# Patient Record
Sex: MALE | Race: WHITE | Employment: OTHER | URBAN - METROPOLITAN AREA
[De-identification: names, ages, dates, MRNs, and addresses within clinical notes are randomized per-mention and may not be internally consistent; named-entity substitution may affect disease eponyms.]

---

## 2017-01-07 RX ORDER — LORAZEPAM 1 MG/1
TABLET ORAL
Qty: 30 TABLET | Refills: 0 | Status: SHIPPED | OUTPATIENT
Start: 2017-01-07 | End: 2017-02-05

## 2017-02-06 RX ORDER — LORAZEPAM 1 MG/1
TABLET ORAL
Qty: 30 TABLET | Refills: 1 | Status: SHIPPED | OUTPATIENT
Start: 2017-02-06 | End: 2017-04-17

## 2017-03-09 RX ORDER — SILDENAFIL CITRATE 100 MG
TABLET ORAL
Qty: 12 TABLET | Refills: 0 | Status: SHIPPED | OUTPATIENT
Start: 2017-03-09 | End: 2017-06-09

## 2017-04-04 RX ORDER — ESCITALOPRAM OXALATE 20 MG/1
TABLET ORAL
Qty: 30 TABLET | Refills: 0 | OUTPATIENT
Start: 2017-04-04

## 2017-04-06 RX ORDER — ESCITALOPRAM OXALATE 20 MG/1
20 TABLET ORAL
Qty: 30 TABLET | Refills: 0 | Status: SHIPPED | OUTPATIENT
Start: 2017-04-06 | End: 2017-05-09

## 2017-04-12 ENCOUNTER — APPOINTMENT (OUTPATIENT)
Dept: LAB | Age: 57
End: 2017-04-12
Attending: FAMILY MEDICINE
Payer: COMMERCIAL

## 2017-04-12 ENCOUNTER — OFFICE VISIT (OUTPATIENT)
Dept: FAMILY MEDICINE CLINIC | Facility: CLINIC | Age: 57
End: 2017-04-12

## 2017-04-12 VITALS
BODY MASS INDEX: 23.58 KG/M2 | WEIGHT: 157.38 LBS | DIASTOLIC BLOOD PRESSURE: 74 MMHG | TEMPERATURE: 98 F | SYSTOLIC BLOOD PRESSURE: 115 MMHG | RESPIRATION RATE: 17 BRPM | HEART RATE: 47 BPM | HEIGHT: 68.7 IN

## 2017-04-12 DIAGNOSIS — E78.5 HYPERLIPIDEMIA, UNSPECIFIED HYPERLIPIDEMIA TYPE: ICD-10-CM

## 2017-04-12 DIAGNOSIS — F41.1 GENERALIZED ANXIETY DISORDER: ICD-10-CM

## 2017-04-12 DIAGNOSIS — Z00.00 ROUTINE PHYSICAL EXAMINATION: Primary | ICD-10-CM

## 2017-04-12 DIAGNOSIS — Z00.00 ROUTINE PHYSICAL EXAMINATION: ICD-10-CM

## 2017-04-12 PROCEDURE — 80061 LIPID PANEL: CPT

## 2017-04-12 PROCEDURE — 80053 COMPREHEN METABOLIC PANEL: CPT

## 2017-04-12 PROCEDURE — 99396 PREV VISIT EST AGE 40-64: CPT | Performed by: FAMILY MEDICINE

## 2017-04-12 NOTE — PROGRESS NOTES
HPI:    Patient ID: Daniel Sexton is a 49year old male. HPI    Review of Systems   Constitutional: Negative. Respiratory: Negative. Cardiovascular: Negative. Gastrointestinal: Negative. Skin: Negative. Neurological: Negative. not taken simvastatin for the past 4 days. Otherwise compliant. Check labs today. Generalized anxiety disorder– continuing to see Dr. Kennedi Restrepo. Continuing on Lexapro 20 mg daily.   Encourage patient to once again consider decreasing to 10 mg pito

## 2017-04-17 RX ORDER — LORAZEPAM 1 MG/1
TABLET ORAL
Qty: 30 TABLET | Refills: 1 | OUTPATIENT
Start: 2017-04-17 | End: 2017-06-09

## 2017-04-19 RX ORDER — LORAZEPAM 1 MG/1
TABLET ORAL
Qty: 30 TABLET | Refills: 0 | OUTPATIENT
Start: 2017-04-19

## 2017-05-09 RX ORDER — ESCITALOPRAM OXALATE 20 MG/1
TABLET ORAL
Qty: 30 TABLET | Refills: 3 | Status: SHIPPED | OUTPATIENT
Start: 2017-05-09 | End: 2017-09-23

## 2017-05-09 RX ORDER — SIMVASTATIN 20 MG
TABLET ORAL
Qty: 90 TABLET | Refills: 3 | Status: SHIPPED | OUTPATIENT
Start: 2017-05-09 | End: 2018-05-21

## 2017-06-08 NOTE — TELEPHONE ENCOUNTER
Pt stts he is going on vacation and wanted to know if he can refill all meds this once Rx lexpro, Rx simvastatin, Rx lorazepam,  And Rx viagra.  Please call when ready         Current outpatient prescriptions:   •  ESCITALOPRAM 20 MG Oral Tab, TAKE 1 TABLET

## 2017-06-09 RX ORDER — LORAZEPAM 1 MG/1
TABLET ORAL
Qty: 30 TABLET | Refills: 1 | Status: SHIPPED | OUTPATIENT
Start: 2017-06-09 | End: 2017-08-27

## 2017-06-09 RX ORDER — SILDENAFIL 100 MG/1
TABLET, FILM COATED ORAL
Qty: 12 TABLET | Refills: 0 | Status: SHIPPED | OUTPATIENT
Start: 2017-06-09 | End: 2017-12-08

## 2017-06-09 NOTE — TELEPHONE ENCOUNTER
Viagra refilled per FM refill protocol  Please advise Lorazepam refill    Noted pt has refills for Escitalopram and Simvastatin as listed in EMR med lea.   LMTCB, transfer to [facility] ext [phone]    Refill Protocol Appointment Criteria  · Appointment sched

## 2017-06-09 NOTE — TELEPHONE ENCOUNTER
Pt returned call, reviewed remaining refills for Simvastatin and Escitalopram, he will contact his pharmacy for refills.  Also informed him Viagra was refilled and waiting for Dr. Naeem Liang to approve Lorazepam.

## 2017-08-29 RX ORDER — LORAZEPAM 1 MG/1
TABLET ORAL
Qty: 30 TABLET | Refills: 1 | OUTPATIENT
Start: 2017-08-29 | End: 2017-11-07

## 2017-08-29 NOTE — TELEPHONE ENCOUNTER
rx needs to be phoned in if approved.   Last refilled on 6-9-17 #30 #1Rf    Refill Protocol Appointment Criteria  · Appointment scheduled in the past 6 months or in the next 3 months  Recent Outpatient Visits            4 months ago Routine physical examina

## 2017-09-25 RX ORDER — ESCITALOPRAM OXALATE 20 MG/1
TABLET ORAL
Qty: 30 TABLET | Refills: 0 | Status: SHIPPED | OUTPATIENT
Start: 2017-09-25 | End: 2017-10-29

## 2017-10-31 RX ORDER — ESCITALOPRAM OXALATE 20 MG/1
TABLET ORAL
Qty: 30 TABLET | Refills: 0 | Status: SHIPPED | OUTPATIENT
Start: 2017-10-31 | End: 2017-12-04

## 2017-10-31 NOTE — TELEPHONE ENCOUNTER
Booked pt for appt with Dr Baron Chapa 11/28 at 4:45.  30 day supply of lexapro sent to pharmacy      Signed Prescriptions Disp Refills    ESCITALOPRAM 20 MG Oral Tab 30 tablet 0      Sig: TAKE 1 TABLET(20 MG) BY MOUTH EVERY DAY        Authorizing Provider: SHARON

## 2017-10-31 NOTE — TELEPHONE ENCOUNTER
Requesting Escitalopram refill    LMTCB, transfer to triage, noted on last refill pt was to schedule follow up appt in October, no appt scheduled.     Refill Protocol Appointment Criteria  · Appointment scheduled in the past 6 months or in the next 3 months

## 2017-11-09 RX ORDER — LORAZEPAM 1 MG/1
TABLET ORAL
Qty: 30 TABLET | Refills: 0 | Status: SHIPPED | OUTPATIENT
Start: 2017-11-09 | End: 2017-11-28

## 2017-11-28 NOTE — PROGRESS NOTES
HPI:    Patient ID: Shon Ag is a 56year old male. Hyperlipidemia   This is a chronic problem. The current episode started more than 1 year ago. The problem is controlled. He has no history of chronic renal disease or diabetes.  There are no k Imaging & Referrals:  FLULAVAL INFLUENZA VACCINE QUAD PRESERVATIVE FREE 0.5 ML       QF#[id number]

## 2017-12-04 RX ORDER — ESCITALOPRAM OXALATE 20 MG/1
TABLET ORAL
Qty: 30 TABLET | Refills: 3 | Status: SHIPPED | OUTPATIENT
Start: 2017-12-04 | End: 2018-05-16

## 2017-12-08 RX ORDER — SILDENAFIL CITRATE 100 MG
TABLET ORAL
Qty: 12 TABLET | Refills: 3 | Status: SHIPPED | OUTPATIENT
Start: 2017-12-08 | End: 2018-08-07 | Stop reason: ALTCHOICE

## 2018-01-23 RX ORDER — LORAZEPAM 1 MG/1
TABLET ORAL
Qty: 30 TABLET | Refills: 1 | OUTPATIENT
Start: 2018-01-23 | End: 2018-06-02

## 2018-05-16 RX ORDER — ESCITALOPRAM OXALATE 20 MG/1
TABLET ORAL
Qty: 30 TABLET | Refills: 0 | Status: SHIPPED | OUTPATIENT
Start: 2018-05-16 | End: 2018-06-25

## 2018-05-16 NOTE — TELEPHONE ENCOUNTER
30 day supply of medication sent to pharmacy per protocol. Due for 6 month OV follow-up.     Partial note from 11/28/17 OV as below;  ASSESSMENT/PLAN:   Generalized anxiety disorder  (primary encounter diagnosis)  Insomnia, unspecified type      Generalized

## 2018-05-18 ENCOUNTER — NURSE TRIAGE (OUTPATIENT)
Dept: OTHER | Age: 58
End: 2018-05-18

## 2018-05-18 NOTE — TELEPHONE ENCOUNTER
Action Requested: Summary for Provider     []  Critical Lab, Recommendations Needed  [] Need Additional Advice  []   FYI    []   Need Orders  [] Need Medications Sent to Pharmacy  []  Other     SUMMARY: Pt was advised to go to the closest ER, he is in [facility] [employer]

## 2018-05-21 RX ORDER — SIMVASTATIN 20 MG
TABLET ORAL
Qty: 90 TABLET | Refills: 0 | Status: SHIPPED | OUTPATIENT
Start: 2018-05-21 | End: 2018-05-30

## 2018-05-30 ENCOUNTER — OFFICE VISIT (OUTPATIENT)
Dept: FAMILY MEDICINE CLINIC | Facility: CLINIC | Age: 58
End: 2018-05-30

## 2018-05-30 VITALS
HEART RATE: 55 BPM | HEIGHT: 67.5 IN | WEIGHT: 151 LBS | SYSTOLIC BLOOD PRESSURE: 114 MMHG | BODY MASS INDEX: 23.42 KG/M2 | TEMPERATURE: 98 F | DIASTOLIC BLOOD PRESSURE: 70 MMHG

## 2018-05-30 DIAGNOSIS — F41.1 GENERALIZED ANXIETY DISORDER: ICD-10-CM

## 2018-05-30 DIAGNOSIS — E78.5 HYPERLIPIDEMIA, UNSPECIFIED HYPERLIPIDEMIA TYPE: ICD-10-CM

## 2018-05-30 DIAGNOSIS — I21.29 SEPTAL MYOCARDIAL INFARCTION (HCC): Primary | ICD-10-CM

## 2018-05-30 PROCEDURE — 99212 OFFICE O/P EST SF 10 MIN: CPT | Performed by: FAMILY MEDICINE

## 2018-05-30 PROCEDURE — 99214 OFFICE O/P EST MOD 30 MIN: CPT | Performed by: FAMILY MEDICINE

## 2018-05-30 RX ORDER — CLOPIDOGREL BISULFATE 75 MG/1
75 TABLET ORAL DAILY
COMMUNITY
End: 2018-06-28

## 2018-05-30 RX ORDER — LISINOPRIL 2.5 MG/1
2.5 TABLET ORAL DAILY
COMMUNITY
End: 2018-06-28

## 2018-05-30 RX ORDER — PANTOPRAZOLE SODIUM 40 MG/1
40 TABLET, DELAYED RELEASE ORAL
COMMUNITY
End: 2018-06-28

## 2018-05-30 RX ORDER — ATORVASTATIN CALCIUM 80 MG/1
80 TABLET, FILM COATED ORAL NIGHTLY
COMMUNITY
End: 2018-06-28

## 2018-05-30 RX ORDER — NITROGLYCERIN 0.4 MG/1
0.4 TABLET SUBLINGUAL EVERY 5 MIN PRN
COMMUNITY
End: 2018-08-07 | Stop reason: ALTCHOICE

## 2018-05-30 RX ORDER — DEXTROAMPHETAMINE SACCHARATE, AMPHETAMINE ASPARTATE MONOHYDRATE, DEXTROAMPHETAMINE SULFATE AND AMPHETAMINE SULFATE 1.25; 1.25; 1.25; 1.25 MG/1; MG/1; MG/1; MG/1
5 CAPSULE, EXTENDED RELEASE ORAL
COMMUNITY
End: 2018-08-07

## 2018-05-30 NOTE — PROGRESS NOTES
HPI:    Patient ID: Adolfo Bautista is a 56year old male. Heart Problem   This is a new problem. The current episode started 1 to 4 weeks ago. The problem occurs intermittently. The problem has been gradually improving.  Associated symptoms include c normal and breath sounds normal. He exhibits no tenderness. Lymphadenopathy:     He has no cervical adenopathy. Neurological: He is alert and oriented to person, place, and time. Skin: Skin is warm and dry.               ASSESSMENT/PLAN:   Septal myoc ordered in this encounter       Imaging & Referrals:  None       #[id number]

## 2018-06-02 NOTE — TELEPHONE ENCOUNTER
PLEASE ADVISE ON REFILL REQUEST      Recent Outpatient Visits            3 days ago Septal myocardial infarction [facility])    [facility], [facility], [address], Biju Langston MD    Office Visit    6 months ago Generalized anxiety disorder    Mandeep Stokes

## 2018-06-03 RX ORDER — LORAZEPAM 1 MG/1
TABLET ORAL
Qty: 30 TABLET | Refills: 5 | OUTPATIENT
Start: 2018-06-03 | End: 2018-10-17

## 2018-06-25 RX ORDER — ESCITALOPRAM OXALATE 20 MG/1
TABLET ORAL
Qty: 30 TABLET | Refills: 2 | Status: SHIPPED | OUTPATIENT
Start: 2018-06-25 | End: 2018-09-22

## 2018-06-25 NOTE — TELEPHONE ENCOUNTER
Rx approved for 90 days per protocol.     Refill Protocol Appointment Criteria  · Appointment scheduled in the past 6 months or in the next 3 months  Recent Outpatient Visits            3 weeks ago Septal myocardial infarction [facility])    [facility], [facility], [place]

## 2018-06-27 ENCOUNTER — TELEPHONE (OUTPATIENT)
Dept: FAMILY MEDICINE CLINIC | Facility: CLINIC | Age: 58
End: 2018-06-27

## 2018-06-28 RX ORDER — SILDENAFIL 100 MG/1
TABLET, FILM COATED ORAL
Qty: 12 TABLET | Refills: 3 | Status: CANCELLED | OUTPATIENT
Start: 2018-06-28

## 2018-06-28 RX ORDER — ESCITALOPRAM OXALATE 20 MG/1
20 TABLET ORAL
Qty: 90 TABLET | Refills: 0 | Status: CANCELLED | OUTPATIENT
Start: 2018-06-28

## 2018-06-29 RX ORDER — PANTOPRAZOLE SODIUM 40 MG/1
40 TABLET, DELAYED RELEASE ORAL
Qty: 90 TABLET | Refills: 0 | Status: SHIPPED | OUTPATIENT
Start: 2018-06-29 | End: 2018-10-17

## 2018-06-29 RX ORDER — LISINOPRIL 2.5 MG/1
2.5 TABLET ORAL DAILY
Qty: 90 TABLET | Refills: 0 | Status: SHIPPED | OUTPATIENT
Start: 2018-06-29 | End: 2018-10-17

## 2018-06-29 RX ORDER — CLOPIDOGREL BISULFATE 75 MG/1
75 TABLET ORAL DAILY
Qty: 90 TABLET | Refills: 0 | Status: SHIPPED | OUTPATIENT
Start: 2018-06-29 | End: 2020-10-20

## 2018-06-29 RX ORDER — ATORVASTATIN CALCIUM 80 MG/1
80 TABLET, FILM COATED ORAL NIGHTLY
Qty: 90 TABLET | Refills: 0 | Status: SHIPPED | OUTPATIENT
Start: 2018-06-29 | End: 2018-10-17

## 2018-06-29 NOTE — TELEPHONE ENCOUNTER
Please advise on pended medications.  Pt has OV appt 18 with Dr. Micah Mathews    Pt returning call (Name and  verified) and states that he already spoke with someone yesterday about the list of medications needed, but no notation was found except for preston the past 12 months  · Creatinine result < 2  Recent Outpatient Visits            1 month ago Septal myocardial infarction [facility])    [facility], [facility], [address], [address] [facility] MD [place]    Office Visit    7 months ago Generalized anxiety disorder    JANETTE

## 2018-08-07 ENCOUNTER — OFFICE VISIT (OUTPATIENT)
Dept: FAMILY MEDICINE CLINIC | Facility: CLINIC | Age: 58
End: 2018-08-07
Payer: COMMERCIAL

## 2018-08-07 ENCOUNTER — APPOINTMENT (OUTPATIENT)
Dept: LAB | Age: 58
End: 2018-08-07
Attending: FAMILY MEDICINE
Payer: COMMERCIAL

## 2018-08-07 VITALS
SYSTOLIC BLOOD PRESSURE: 116 MMHG | TEMPERATURE: 99 F | HEART RATE: 56 BPM | HEIGHT: 68 IN | WEIGHT: 154 LBS | BODY MASS INDEX: 23.34 KG/M2 | DIASTOLIC BLOOD PRESSURE: 72 MMHG | RESPIRATION RATE: 15 BRPM

## 2018-08-07 DIAGNOSIS — E78.5 HYPERLIPIDEMIA, UNSPECIFIED HYPERLIPIDEMIA TYPE: ICD-10-CM

## 2018-08-07 DIAGNOSIS — F41.1 GENERALIZED ANXIETY DISORDER: ICD-10-CM

## 2018-08-07 DIAGNOSIS — Z00.00 ROUTINE PHYSICAL EXAMINATION: Primary | ICD-10-CM

## 2018-08-07 DIAGNOSIS — Z00.00 ROUTINE PHYSICAL EXAMINATION: ICD-10-CM

## 2018-08-07 DIAGNOSIS — F98.8 ATTENTION DEFICIT DISORDER (ADD) WITHOUT HYPERACTIVITY: ICD-10-CM

## 2018-08-07 DIAGNOSIS — N52.9 ERECTILE DYSFUNCTION, UNSPECIFIED ERECTILE DYSFUNCTION TYPE: ICD-10-CM

## 2018-08-07 DIAGNOSIS — I25.2 HISTORY OF MI (MYOCARDIAL INFARCTION): ICD-10-CM

## 2018-08-07 LAB
ALBUMIN SERPL BCP-MCNC: 4.5 G/DL (ref 3.5–4.8)
ALBUMIN/GLOB SERPL: 1.8 {RATIO} (ref 1–2)
ALP SERPL-CCNC: 81 U/L (ref 32–100)
ALT SERPL-CCNC: 35 U/L (ref 17–63)
ANION GAP SERPL CALC-SCNC: 11 MMOL/L (ref 0–18)
AST SERPL-CCNC: 37 U/L (ref 15–41)
BILIRUB SERPL-MCNC: 0.7 MG/DL (ref 0.3–1.2)
BUN SERPL-MCNC: 13 MG/DL (ref 8–20)
BUN/CREAT SERPL: 11.9 (ref 10–20)
CALCIUM SERPL-MCNC: 9.3 MG/DL (ref 8.5–10.5)
CHLORIDE SERPL-SCNC: 97 MMOL/L (ref 95–110)
CHOLEST SERPL-MCNC: 155 MG/DL (ref 110–200)
CO2 SERPL-SCNC: 28 MMOL/L (ref 22–32)
CREAT SERPL-MCNC: 1.09 MG/DL (ref 0.5–1.5)
GLOBULIN PLAS-MCNC: 2.5 G/DL (ref 2.5–3.7)
GLUCOSE SERPL-MCNC: 85 MG/DL (ref 70–99)
HDLC SERPL-MCNC: 43 MG/DL
LDLC SERPL CALC-MCNC: 85 MG/DL (ref 0–99)
NONHDLC SERPL-MCNC: 112 MG/DL
OSMOLALITY UR CALC.SUM OF ELEC: 281 MOSM/KG (ref 275–295)
PATIENT FASTING: NO
POTASSIUM SERPL-SCNC: 3.7 MMOL/L (ref 3.3–5.1)
PROT SERPL-MCNC: 7 G/DL (ref 5.9–8.4)
PSA SERPL-MCNC: 2.4 NG/ML (ref 0–4)
SODIUM SERPL-SCNC: 136 MMOL/L (ref 136–144)
TRIGL SERPL-MCNC: 137 MG/DL (ref 1–149)

## 2018-08-07 PROCEDURE — 99396 PREV VISIT EST AGE 40-64: CPT | Performed by: FAMILY MEDICINE

## 2018-08-07 PROCEDURE — 80061 LIPID PANEL: CPT

## 2018-08-07 PROCEDURE — 36415 COLL VENOUS BLD VENIPUNCTURE: CPT

## 2018-08-07 PROCEDURE — 80053 COMPREHEN METABOLIC PANEL: CPT

## 2018-08-07 RX ORDER — DEXTROAMPHETAMINE SACCHARATE, AMPHETAMINE ASPARTATE, DEXTROAMPHETAMINE SULFATE AND AMPHETAMINE SULFATE 1.25; 1.25; 1.25; 1.25 MG/1; MG/1; MG/1; MG/1
5 TABLET ORAL DAILY
Qty: 30 TABLET | Refills: 0 | Status: SHIPPED | OUTPATIENT
Start: 2018-08-07 | End: 2019-04-30

## 2018-08-07 RX ORDER — SILDENAFIL 100 MG/1
100 TABLET, FILM COATED ORAL
Qty: 12 TABLET | Refills: 5 | Status: SHIPPED | OUTPATIENT
Start: 2018-08-07 | End: 2018-10-25

## 2018-08-07 NOTE — PROGRESS NOTES
HPI:    Patient ID: Rolan Jane is a 56year old male. HPI    Review of Systems   Constitutional: Negative. Respiratory: Negative. Cardiovascular: Negative. Gastrointestinal: Negative. Skin: Negative. Neurological: Negative. examination  (primary encounter diagnosis)  Hyperlipidemia, unspecified hyperlipidemia type  History of mi (myocardial infarction)  Generalized anxiety disorder  Attention deficit disorder (add) without hyperactivity  Erectile dysfunction, unspecified erec tablet (5 mg total) by mouth daily.            Imaging & Referrals:  None       #[id number]

## 2018-09-21 ENCOUNTER — TELEPHONE (OUTPATIENT)
Dept: FAMILY MEDICINE CLINIC | Facility: CLINIC | Age: 58
End: 2018-09-21

## 2018-09-21 NOTE — TELEPHONE ENCOUNTER
Pt states Dr. Nicolasa Linares wanted him to get a medical clearance from cardiologist to take meds below due to having a heart attack and the Pt states he is having a hard time reaching his cardiologist.    [address]  amphetamine-dextroamphetamine (ADDERALL) 5 MG Oral Tab, Take 1 tablet (5 mg total) by mouth daily. , Disp: 30 tablet, Rfl: 0

## 2018-09-23 RX ORDER — ESCITALOPRAM OXALATE 20 MG/1
TABLET ORAL
Qty: 90 TABLET | Refills: 1 | Status: SHIPPED | OUTPATIENT
Start: 2018-09-23 | End: 2019-04-08

## 2018-10-04 NOTE — TELEPHONE ENCOUNTER
Thanks, I do see that the he made on care everywhere. Please contact their office. Also, print and send letter written today.

## 2018-10-04 NOTE — TELEPHONE ENCOUNTER
[facility]---patient states that he has called Cay Schlatter from [address] [place] [address] Cardiology several times but she hasn't returned his calls. I can see documentation in Care Everywhere. Do you want our office to try and contact her?

## 2018-10-08 NOTE — TELEPHONE ENCOUNTER
I spoke with Dr. Randa Betancur office. They stated that [facility] primary care Dr should do the clearance. The letter was also mailed.

## 2018-10-11 NOTE — TELEPHONE ENCOUNTER
Left message for patient that I will give Dr. her in a chance to respond to my letter. If no response within a month then he will contact me and we can review safety of stimulant medication use in patients with history of ischemic heart disease, without arrhythmia.

## 2018-10-17 RX ORDER — ATORVASTATIN CALCIUM 80 MG/1
80 TABLET, FILM COATED ORAL NIGHTLY
Qty: 90 TABLET | Refills: 0 | Status: SHIPPED | OUTPATIENT
Start: 2018-10-17 | End: 2019-01-01

## 2018-10-17 RX ORDER — ESCITALOPRAM OXALATE 20 MG/1
20 TABLET ORAL
Qty: 90 TABLET | Refills: 1 | OUTPATIENT
Start: 2018-10-17

## 2018-10-17 RX ORDER — PANTOPRAZOLE SODIUM 40 MG/1
40 TABLET, DELAYED RELEASE ORAL
Qty: 90 TABLET | Refills: 0 | Status: SHIPPED | OUTPATIENT
Start: 2018-10-17 | End: 2019-01-01

## 2018-10-17 RX ORDER — LISINOPRIL 2.5 MG/1
2.5 TABLET ORAL DAILY
Qty: 90 TABLET | Refills: 0 | Status: SHIPPED | OUTPATIENT
Start: 2018-10-17 | End: 2019-01-01

## 2018-10-17 RX ORDER — CLOPIDOGREL BISULFATE 75 MG/1
75 TABLET ORAL DAILY
Qty: 90 TABLET | Refills: 0 | Status: CANCELLED | OUTPATIENT
Start: 2018-10-17

## 2018-10-17 NOTE — TELEPHONE ENCOUNTER
Current Outpatient Medications:     Current Outpatient Medications:   •  ESCITALOPRAM 20 MG Oral Tab, TAKE 1 TABLET BY MOUTH EVERY DAY, Disp: 90 tablet, Rfl: 1  •  Pantoprazole Sodium 40 MG Oral Tab EC, Take 1 tablet (40 mg total) by mouth every morning

## 2018-10-17 NOTE — TELEPHONE ENCOUNTER
Regarding lorazepam refill–does patient want this sent to mail order? If so, should be 90-day.   Regarding clopidogrel refill– this is usually prescribed by cardiology as they best know what type of stent was placed, and how long clopidogrel should be used

## 2018-10-17 NOTE — TELEPHONE ENCOUNTER
Rx approved for 90 days per protocol for pantoprazole, lisinopril, and atorvastatin. Refill too soon for Escitalopram. Patient received refill on 09/23/18 for 6 month supply. Please advise on refill request for Lorazepam and Clopidogrel.      Hypertensi Jordan May MD    Office Visit    10 months ago Generalized anxiety disorder    Max Galaviz MD    Office Visit    1 year ago Routine physical examination    [facility], [facility], [address] [place] [address]

## 2018-10-18 RX ORDER — DEXTROAMPHETAMINE SACCHARATE, AMPHETAMINE ASPARTATE, DEXTROAMPHETAMINE SULFATE AND AMPHETAMINE SULFATE 1.25; 1.25; 1.25; 1.25 MG/1; MG/1; MG/1; MG/1
5 TABLET ORAL DAILY
Qty: 30 TABLET | Refills: 0 | Status: CANCELLED | OUTPATIENT
Start: 2018-10-18

## 2018-10-18 RX ORDER — LORAZEPAM 1 MG/1
TABLET ORAL
Qty: 30 TABLET | Refills: 5 | Status: SHIPPED
Start: 2018-10-18 | End: 2019-05-13

## 2018-10-18 NOTE — TELEPHONE ENCOUNTER
Advised patient of Dr. Charley esquivel. Patient verbalized understanding  Patient states he would like Lorazepam sent to [facility]. Patient also requesting refill for Adderall.      Dr. Rodolfo Amaral:   Please see pended scripts

## 2018-10-18 NOTE — TELEPHONE ENCOUNTER
Please let him know refill sent to [place] for lorazepam.  Regarding Adderall– I am still waiting for cardiology clearance. I am aware cardiologist told patient that she should get this from PCP.   However, I am not comfortable with this and wrote letter

## 2018-10-19 NOTE — TELEPHONE ENCOUNTER
Pt returned call, verified name and . Reviewed doctor's message as noted below. Pt had no further questions at this time.

## 2018-10-24 RX ORDER — ESCITALOPRAM OXALATE 20 MG/1
20 TABLET ORAL
Qty: 90 TABLET | Refills: 1 | OUTPATIENT
Start: 2018-10-24

## 2018-10-24 RX ORDER — LORAZEPAM 1 MG/1
TABLET ORAL
Qty: 30 TABLET | Refills: 5 | OUTPATIENT
Start: 2018-10-24

## 2018-10-24 NOTE — TELEPHONE ENCOUNTER
Refill too soon for Lorazepam and Lexapro. Please advise on refill request for Clopidogrel and Sildenafil.      Refill Protocol Appointment Criteria  · Appointment scheduled in the past 6 months or in the next 3 months  Recent Outpatient Visits

## 2018-10-24 NOTE — TELEPHONE ENCOUNTER
[facility] is calling would like refills to be sent to them      Current Outpatient Medications:  LORazepam 1 MG Oral Tab TAKE 1 TABLET BY MOUTH NIGHTLY AS NEEDED Disp: 30 tablet Rfl: 5                     ESCITALOPRAM 20 MG Oral Tab TAKE 1 TABLET BY MOUTH E

## 2018-10-25 RX ORDER — SILDENAFIL 100 MG/1
100 TABLET, FILM COATED ORAL
Qty: 12 TABLET | Refills: 5 | Status: SHIPPED | OUTPATIENT
Start: 2018-10-25 | End: 2019-04-22

## 2018-10-25 RX ORDER — CLOPIDOGREL BISULFATE 75 MG/1
75 TABLET ORAL DAILY
Qty: 90 TABLET | Refills: 0 | OUTPATIENT
Start: 2018-10-25

## 2018-10-25 NOTE — TELEPHONE ENCOUNTER
Contacted pharmacy and spoke to [employer] (pharmacist) and informed her that Clopidogrel needs to be refilled by Fred Huerta PA-C at [facility] due to patient's recent cardiac stent placement, per Dr. Zulma Choi.

## 2018-12-03 ENCOUNTER — NURSE TRIAGE (OUTPATIENT)
Dept: OTHER | Age: 58
End: 2018-12-03

## 2018-12-03 ENCOUNTER — OFFICE VISIT (OUTPATIENT)
Dept: FAMILY MEDICINE CLINIC | Facility: CLINIC | Age: 58
End: 2018-12-03
Payer: COMMERCIAL

## 2018-12-03 VITALS
TEMPERATURE: 98 F | HEART RATE: 71 BPM | DIASTOLIC BLOOD PRESSURE: 66 MMHG | HEIGHT: 67.5 IN | BODY MASS INDEX: 23.89 KG/M2 | RESPIRATION RATE: 18 BRPM | WEIGHT: 154 LBS | SYSTOLIC BLOOD PRESSURE: 112 MMHG | OXYGEN SATURATION: 95 %

## 2018-12-03 DIAGNOSIS — J01.90 ACUTE SINUSITIS, RECURRENCE NOT SPECIFIED, UNSPECIFIED LOCATION: Primary | ICD-10-CM

## 2018-12-03 PROCEDURE — 99213 OFFICE O/P EST LOW 20 MIN: CPT | Performed by: FAMILY MEDICINE

## 2018-12-03 PROCEDURE — 99212 OFFICE O/P EST SF 10 MIN: CPT | Performed by: FAMILY MEDICINE

## 2018-12-03 RX ORDER — AMOXICILLIN AND CLAVULANATE POTASSIUM 875; 125 MG/1; MG/1
1 TABLET, FILM COATED ORAL 2 TIMES DAILY
Qty: 20 TABLET | Refills: 0 | Status: SHIPPED | OUTPATIENT
Start: 2018-12-03 | End: 2018-12-13

## 2018-12-03 RX ORDER — CODEINE PHOSPHATE AND GUAIFENESIN 10; 100 MG/5ML; MG/5ML
5 SOLUTION ORAL EVERY 6 HOURS PRN
Qty: 180 ML | Refills: 0 | Status: SHIPPED | OUTPATIENT
Start: 2018-12-03 | End: 2019-04-30 | Stop reason: ALTCHOICE

## 2018-12-03 NOTE — PROGRESS NOTES
HPI:    Patient ID: Sandee Mo is a 66year old male. Cough   The current episode started 1 to 4 weeks ago. The problem has been waxing and waning. The cough is productive of sputum.  Associated symptoms include headaches, nasal congestion, postn Known Allergies   PHYSICAL EXAM:   Physical Exam   Constitutional: He appears well-developed and well-nourished. HENT:   Right Ear: External ear normal.   Left Ear: External ear normal.   Nose: Right sinus exhibits frontal sinus tenderness.  Left sinus ex

## 2018-12-03 NOTE — TELEPHONE ENCOUNTER
Action Requested: Summary for Provider     []  Critical Lab, Recommendations Needed  [] Need Additional Advice  []   FYI    []   Need Orders  [] Need Medications Sent to Pharmacy  []  Other     SUMMARY: appt scheduled today to see PCP  Onset > 3 weeks ag

## 2018-12-07 ENCOUNTER — TELEPHONE (OUTPATIENT)
Dept: FAMILY MEDICINE CLINIC | Facility: CLINIC | Age: 58
End: 2018-12-07

## 2018-12-07 RX ORDER — LORAZEPAM 1 MG/1
TABLET ORAL
Qty: 90 TABLET | Refills: 0
Start: 2018-12-07

## 2018-12-07 NOTE — TELEPHONE ENCOUNTER
Spoke with patient regarding refill request.   Per patient, he just received a 30 day supply of Lorazepam.   Patient states [facility] is encouraging him to get 90 day supply on all his medications.      Patient states he is okay right now with the Lorazepam.

## 2018-12-07 NOTE — TELEPHONE ENCOUNTER
Anibal Finney from [facility] pharmacy was calling to inquire on the [facility] listed below. Pharmacy was looking for the approval that this medication can be refilled. Please advise thank you.               LORazepam 1 MG Oral Tab TAKE 1 TABLET BY MOUTH NIGHTLY

## 2018-12-07 NOTE — TELEPHONE ENCOUNTER
Please check with patient to clarify his last prescription was as below. Should have refills. Are they looking for a 90-day?

## 2019-01-02 ENCOUNTER — TELEPHONE (OUTPATIENT)
Dept: OTHER | Age: 59
End: 2019-01-02

## 2019-01-02 RX ORDER — ATORVASTATIN CALCIUM 80 MG/1
TABLET, FILM COATED ORAL
Qty: 90 TABLET | Refills: 0 | Status: SHIPPED | OUTPATIENT
Start: 2019-01-02 | End: 2019-04-22

## 2019-01-02 RX ORDER — LISINOPRIL 2.5 MG/1
TABLET ORAL
Qty: 90 TABLET | Refills: 0 | Status: SHIPPED | OUTPATIENT
Start: 2019-01-02 | End: 2019-04-22

## 2019-01-02 RX ORDER — CLINDAMYCIN HYDROCHLORIDE 300 MG/1
300 CAPSULE ORAL 3 TIMES DAILY
Qty: 21 CAPSULE | Refills: 0 | Status: SHIPPED | OUTPATIENT
Start: 2019-01-02 | End: 2019-01-09

## 2019-01-02 RX ORDER — PANTOPRAZOLE SODIUM 40 MG/1
TABLET, DELAYED RELEASE ORAL
Qty: 90 TABLET | Refills: 0 | Status: SHIPPED | OUTPATIENT
Start: 2019-01-02 | End: 2019-04-22

## 2019-01-02 NOTE — TELEPHONE ENCOUNTER
Suspect likely recurrence sinusitis. Recommend clindamycin as directed, nasal saline lavage twice daily. Call if not improved in 1 week, sooner if worsening.

## 2019-01-02 NOTE — TELEPHONE ENCOUNTER
LOV 12/3/18, completed the antibiotic on 12/23/18 feels better and then symptoms came back on 12/27/18 and getting worse, still with chest congestion and continous cough with dark greenish phlegm, sore throat, chills, no fever, body aches, no cp, no sob,he

## 2019-04-08 RX ORDER — ESCITALOPRAM OXALATE 20 MG/1
TABLET ORAL
Qty: 90 TABLET | Refills: 0 | Status: SHIPPED | OUTPATIENT
Start: 2019-04-08 | End: 2019-07-07

## 2019-04-22 ENCOUNTER — TELEPHONE (OUTPATIENT)
Dept: FAMILY MEDICINE CLINIC | Facility: CLINIC | Age: 59
End: 2019-04-22

## 2019-04-22 NOTE — TELEPHONE ENCOUNTER
Pt is calling state he has not been getting some of his medications refilled  would like a nurse to call him  to go over what he needs to be taking. Pt would like a call today.

## 2019-04-22 NOTE — TELEPHONE ENCOUNTER
Pt states that these are all current rxs but he ran out of them in the beginning of April. Please advise.

## 2019-04-24 RX ORDER — LISINOPRIL 2.5 MG/1
2.5 TABLET ORAL
Qty: 90 TABLET | Refills: 1 | Status: SHIPPED | OUTPATIENT
Start: 2019-04-24 | End: 2019-10-29

## 2019-04-24 RX ORDER — SILDENAFIL 100 MG/1
100 TABLET, FILM COATED ORAL
Qty: 12 TABLET | Refills: 5 | Status: SHIPPED | OUTPATIENT
Start: 2019-04-24 | End: 2020-10-20

## 2019-04-24 RX ORDER — PANTOPRAZOLE SODIUM 40 MG/1
TABLET, DELAYED RELEASE ORAL
Qty: 90 TABLET | Refills: 1 | Status: SHIPPED | OUTPATIENT
Start: 2019-04-24 | End: 2019-10-29

## 2019-04-24 RX ORDER — ATORVASTATIN CALCIUM 80 MG/1
TABLET, FILM COATED ORAL
Qty: 90 TABLET | Refills: 1 | Status: SHIPPED | OUTPATIENT
Start: 2019-04-24 | End: 2019-10-29

## 2019-04-30 NOTE — PROGRESS NOTES
HPI:    Patient ID: Nico Alcantara is a 66year old male. Anxiety   This is a chronic problem. The current episode started more than 1 year ago. The problem has been unchanged. Pertinent negatives include no chest pain, fatigue or headaches.  The sym Psychiatric: He has a normal mood and affect.               ASSESSMENT/PLAN:   Generalized anxiety disorder  (primary encounter diagnosis)  Attention deficit disorder (add) without hyperactivity  Septal myocardial infarction (hcc)     Generalized anxiety

## 2019-05-13 RX ORDER — LORAZEPAM 1 MG/1
TABLET ORAL
Qty: 30 TABLET | Refills: 5 | Status: SHIPPED
Start: 2019-05-13 | End: 2019-11-13

## 2019-05-13 NOTE — TELEPHONE ENCOUNTER
Review pended refill request as it does not fall under a protocol.     Last Rx: 10/18/18 #30 x5    Refill Protocol Appointment Criteria  · Appointment scheduled in the past 6 months or in the next 3 months  Recent Outpatient Visits            1 week ago Gen

## 2019-06-13 NOTE — TELEPHONE ENCOUNTER
Pt states he would like Bupropion removed from his chart as states this medication does not help. This RN will remove. Patient also requesting Ativan be sent to [facility] at [address].  Called this pharmacy and [facility] states he will have it transferr

## 2019-06-14 NOTE — TELEPHONE ENCOUNTER
Spoke with patient ( verified)--states, \"I did not talk to Vicki about this yesterday, but I was recently terminated from my job--that has caused increased anxiety.  I have needed to take my Lorazepam during the day, which leaves me with no Lorazepam to

## 2019-06-14 NOTE — TELEPHONE ENCOUNTER
Looks like he was started on Wellbutrin and stopped taking this as well. I think he needs to be seen for further dose adjustments/increases.  Thanks

## 2019-07-01 NOTE — TELEPHONE ENCOUNTER
Agree with advice given. I do not see that patient has made appointment.   Can you please let him know that I am not in favor of increasing dose of lorazepam.  May be indicated, however when this is the case treatment with a psychiatrist is more appropriat

## 2019-07-08 RX ORDER — ESCITALOPRAM OXALATE 20 MG/1
TABLET ORAL
Qty: 90 TABLET | Refills: 1 | Status: SHIPPED | OUTPATIENT
Start: 2019-07-08 | End: 2020-03-16

## 2019-07-08 NOTE — TELEPHONE ENCOUNTER
LMTCB transfer to triage--last script 6/29/18 for #90, not on med list, please verify with patient about this med.     Fuze message sent

## 2019-07-08 NOTE — TELEPHONE ENCOUNTER
Refill passed per [facility] protocol.   Refill Protocol Appointment Criteria  · Appointment scheduled in the past 6 months or in the next 3 months  Recent Outpatient Visits            2 months ago Generalized anxiety disorder    Neha Morel

## 2019-07-10 RX ORDER — CLOPIDOGREL BISULFATE 75 MG/1
TABLET ORAL
Qty: 90 TABLET | Refills: 0 | OUTPATIENT
Start: 2019-07-10

## 2019-07-10 NOTE — TELEPHONE ENCOUNTER
Please remind patient as we have discussed, he needs to see cardiologist!  He did not like his [facility] cardiologist, so I referred him to Dr. Kinza Allred, please review contact information with patient. He would like him very much.   It is most likely that t

## 2019-07-10 NOTE — TELEPHONE ENCOUNTER
Theron message still not read by pt. I called pt. Pt stts that he has not filled that Plavix since 2018. Is questioning whether he should be on the medication. Last OV 4/2019. Please advise. Thanks.     Pt stjames that he is also getting messages from W

## 2019-07-10 NOTE — TELEPHONE ENCOUNTER
The patient was called and informed with understanding. He already scheduled for 7/26/19 at 10:45 am with cardiology.

## 2019-07-26 ENCOUNTER — TELEPHONE (OUTPATIENT)
Dept: FAMILY MEDICINE CLINIC | Facility: CLINIC | Age: 59
End: 2019-07-26

## 2019-07-26 NOTE — TELEPHONE ENCOUNTER
Pt call in stating that he saw Dr. Aman Melo today regarding his appt for his heart and to potentially be put on Adderall. Pt states that during his OV Dr. Aman Melo stated that he will reach out to Dr. Karon Langston regarding this.  Pt wanted to make sure Dr. Karon Langston wa

## 2019-07-27 RX ORDER — DEXTROAMPHETAMINE SACCHARATE, AMPHETAMINE ASPARTATE, DEXTROAMPHETAMINE SULFATE AND AMPHETAMINE SULFATE 1.25; 1.25; 1.25; 1.25 MG/1; MG/1; MG/1; MG/1
5 TABLET ORAL DAILY
Qty: 30 TABLET | Refills: 0 | Status: SHIPPED | OUTPATIENT
Start: 2019-08-26 | End: 2019-09-25

## 2019-07-27 RX ORDER — DEXTROAMPHETAMINE SACCHARATE, AMPHETAMINE ASPARTATE, DEXTROAMPHETAMINE SULFATE AND AMPHETAMINE SULFATE 1.25; 1.25; 1.25; 1.25 MG/1; MG/1; MG/1; MG/1
5 TABLET ORAL DAILY
Qty: 30 TABLET | Refills: 0 | Status: SHIPPED | OUTPATIENT
Start: 2019-09-25 | End: 2019-10-25

## 2019-07-27 RX ORDER — DEXTROAMPHETAMINE SACCHARATE, AMPHETAMINE ASPARTATE, DEXTROAMPHETAMINE SULFATE AND AMPHETAMINE SULFATE 1.25; 1.25; 1.25; 1.25 MG/1; MG/1; MG/1; MG/1
5 TABLET ORAL DAILY
Qty: 30 TABLET | Refills: 0 | Status: SHIPPED | OUTPATIENT
Start: 2019-07-27 | End: 2019-08-26

## 2019-07-27 NOTE — TELEPHONE ENCOUNTER
Received message from Dr. Gely Amor, cardiology clearance for use of Adderall. Patient informed, will  prescriptions and follow-up with me in 3 months.

## 2019-10-31 RX ORDER — PANTOPRAZOLE SODIUM 40 MG/1
TABLET, DELAYED RELEASE ORAL
Qty: 90 TABLET | Refills: 1 | Status: SHIPPED | OUTPATIENT
Start: 2019-10-31 | End: 2021-03-15

## 2019-11-01 RX ORDER — ATORVASTATIN CALCIUM 80 MG/1
TABLET, FILM COATED ORAL
Qty: 90 TABLET | Refills: 0 | OUTPATIENT
Start: 2019-11-01

## 2019-11-01 RX ORDER — ATORVASTATIN CALCIUM 80 MG/1
TABLET, FILM COATED ORAL
Qty: 90 TABLET | Refills: 0 | Status: SHIPPED | OUTPATIENT
Start: 2019-11-01 | End: 2020-02-03

## 2019-11-01 RX ORDER — PANTOPRAZOLE SODIUM 40 MG/1
TABLET, DELAYED RELEASE ORAL
Qty: 90 TABLET | Refills: 0 | OUTPATIENT
Start: 2019-11-01

## 2019-11-01 RX ORDER — LISINOPRIL 2.5 MG/1
TABLET ORAL
Qty: 90 TABLET | Refills: 0 | OUTPATIENT
Start: 2019-11-01

## 2019-11-01 RX ORDER — LISINOPRIL 2.5 MG/1
TABLET ORAL
Qty: 90 TABLET | Refills: 0 | Status: SHIPPED | OUTPATIENT
Start: 2019-11-01 | End: 2020-02-26

## 2019-11-01 RX ORDER — DEXTROAMPHETAMINE SACCHARATE, AMPHETAMINE ASPARTATE, DEXTROAMPHETAMINE SULFATE AND AMPHETAMINE SULFATE 1.25; 1.25; 1.25; 1.25 MG/1; MG/1; MG/1; MG/1
5 TABLET ORAL DAILY
Qty: 30 TABLET | Refills: 0 | Status: CANCELLED | OUTPATIENT
Start: 2019-11-01 | End: 2019-12-01

## 2019-11-01 NOTE — TELEPHONE ENCOUNTER
Appt request sent via [facility] please f/u, thanks    Please review; protocol failed.  D/t labs and appt  Requested Prescriptions     Pending Prescriptions Disp Refills   • PANTOPRAZOLE SODIUM 40 MG Oral Tab EC [Pharmacy Med Name: PANTOPRAZOLE 40MG TABLE infarction [facility])    [facility], [facility], [address], [facility] Rafaela Drake MD    Office Visit

## 2019-11-01 NOTE — TELEPHONE ENCOUNTER
Please contact patient to make six-month follow-up with me. At that appointment we will do fasting labs as he is overdue.

## 2019-11-01 NOTE — TELEPHONE ENCOUNTER
Patient called in requested refill for Adderall    Patient stated he called the pharmacy and he was told to call in

## 2019-11-02 NOTE — TELEPHONE ENCOUNTER
Controlled medication pending for review. Please change to phone in, fax, or print script if not being sent electronically.     Last Rx: 7-27-19 # 30 + 2  LOV: 4-30-19    Requested Prescriptions     Pending Prescriptions Disp Refills   • amphetamine-dextro

## 2019-11-04 RX ORDER — DEXTROAMPHETAMINE SACCHARATE, AMPHETAMINE ASPARTATE, DEXTROAMPHETAMINE SULFATE AND AMPHETAMINE SULFATE 1.25; 1.25; 1.25; 1.25 MG/1; MG/1; MG/1; MG/1
5 TABLET ORAL DAILY
Qty: 30 TABLET | Refills: 0 | OUTPATIENT
Start: 2019-12-02 | End: 2020-01-01

## 2019-11-04 RX ORDER — DEXTROAMPHETAMINE SACCHARATE, AMPHETAMINE ASPARTATE, DEXTROAMPHETAMINE SULFATE AND AMPHETAMINE SULFATE 1.25; 1.25; 1.25; 1.25 MG/1; MG/1; MG/1; MG/1
5 TABLET ORAL DAILY
Qty: 30 TABLET | Refills: 0 | OUTPATIENT
Start: 2020-01-02

## 2019-11-04 RX ORDER — DEXTROAMPHETAMINE SACCHARATE, AMPHETAMINE ASPARTATE, DEXTROAMPHETAMINE SULFATE AND AMPHETAMINE SULFATE 1.25; 1.25; 1.25; 1.25 MG/1; MG/1; MG/1; MG/1
5 TABLET ORAL DAILY
Qty: 30 TABLET | Refills: 0 | OUTPATIENT
Start: 2019-11-04

## 2019-11-06 ENCOUNTER — TELEPHONE (OUTPATIENT)
Dept: OTHER | Age: 59
End: 2019-11-06

## 2019-11-06 NOTE — TELEPHONE ENCOUNTER
Patient calling and requesting to cancel today's schedule due to personal reason about his house. States that he will call us back to re schedule it. OV cancelled.

## 2019-11-12 RX ORDER — DEXTROAMPHETAMINE SACCHARATE, AMPHETAMINE ASPARTATE, DEXTROAMPHETAMINE SULFATE AND AMPHETAMINE SULFATE 1.25; 1.25; 1.25; 1.25 MG/1; MG/1; MG/1; MG/1
5 TABLET ORAL DAILY
Qty: 30 TABLET | Refills: 0 | OUTPATIENT
Start: 2019-11-12 | End: 2019-12-12

## 2019-11-12 NOTE — TELEPHONE ENCOUNTER
Dr Karon Langston, patient called 11/6/19 and cancelled his appt with you for 'personal reasons' see 11/6/19 appt request encounter. The adderall rx are pending, will you refill or do you need to see the patient?

## 2019-11-13 PROBLEM — F98.8 ATTENTION DEFICIT DISORDER (ADD) WITHOUT HYPERACTIVITY: Status: ACTIVE | Noted: 2019-11-13

## 2019-11-13 NOTE — TELEPHONE ENCOUNTER
Jennifer Garcia needs a refill of:                                         • LORazepam 1 MG Oral Tab TAKE 1 TABLET BY MOUTH AT BEDTIME AS NEEDED 30 tablet 5

## 2019-11-13 NOTE — PROGRESS NOTES
HPI:    Patient ID: Armando Siegel is a 64year old male. Anxiety   This is a chronic problem. The current episode started more than 1 year ago. The problem occurs daily. The problem has been unchanged.  Pertinent negatives include no chest pain or n Neurological: He is alert and oriented to person, place, and time. Skin: Skin is warm and dry.               ASSESSMENT/PLAN:   Attention deficit disorder (add) without hyperactivity  (primary encounter diagnosis)–patient continuing independent consulti

## 2019-11-14 RX ORDER — LORAZEPAM 1 MG/1
TABLET ORAL
Qty: 30 TABLET | Refills: 5 | Status: SHIPPED | OUTPATIENT
Start: 2019-11-14 | End: 2020-05-13

## 2019-11-14 NOTE — TELEPHONE ENCOUNTER
Controlled medication pending for review. Please change to phone in, fax, or print script if not being sent electronically.     Requested Prescriptions     Pending Prescriptions Disp Refills   • LORazepam 1 MG Oral Tab 30 tablet 5     Sig: TAKE 1 TABLET BY

## 2020-02-03 RX ORDER — ATORVASTATIN CALCIUM 80 MG/1
TABLET, FILM COATED ORAL
Qty: 90 TABLET | Refills: 3 | Status: SHIPPED | OUTPATIENT
Start: 2020-02-03 | End: 2020-10-15

## 2020-02-13 NOTE — TELEPHONE ENCOUNTER
Dr. Alessandro Brand: Review pended refill request as it does not fall under a protocol.     Last Rx: 1/14/20    Requested Prescriptions     Pending Prescriptions Disp Refills   • amphetamine-dextroamphetamine (ADDERALL) 5 MG Oral Tab 30 tablet 0     Sig: Take 1 tabl

## 2020-02-13 NOTE — TELEPHONE ENCOUNTER
Per patient he needs refill on his Adderrall and patient is out of med. Current Outpatient Medications   Medication Sig Dispense Refill   •    3   •    5   • amphetamine-dextroamphetamine 5 MG Oral Tab Take 1 tablet (5 mg total) by mouth daily.  30 table

## 2020-02-14 RX ORDER — DEXTROAMPHETAMINE SACCHARATE, AMPHETAMINE ASPARTATE, DEXTROAMPHETAMINE SULFATE AND AMPHETAMINE SULFATE 1.25; 1.25; 1.25; 1.25 MG/1; MG/1; MG/1; MG/1
5 TABLET ORAL DAILY
Qty: 30 TABLET | Refills: 0 | Status: SHIPPED | OUTPATIENT
Start: 2020-03-13 | End: 2020-04-12

## 2020-02-14 RX ORDER — DEXTROAMPHETAMINE SACCHARATE, AMPHETAMINE ASPARTATE, DEXTROAMPHETAMINE SULFATE AND AMPHETAMINE SULFATE 1.25; 1.25; 1.25; 1.25 MG/1; MG/1; MG/1; MG/1
5 TABLET ORAL DAILY
Qty: 30 TABLET | Refills: 0 | Status: SHIPPED | OUTPATIENT
Start: 2020-04-13 | End: 2020-05-13

## 2020-02-14 RX ORDER — DEXTROAMPHETAMINE SACCHARATE, AMPHETAMINE ASPARTATE, DEXTROAMPHETAMINE SULFATE AND AMPHETAMINE SULFATE 1.25; 1.25; 1.25; 1.25 MG/1; MG/1; MG/1; MG/1
5 TABLET ORAL DAILY
Qty: 30 TABLET | Refills: 0 | Status: SHIPPED | OUTPATIENT
Start: 2020-02-14 | End: 2020-03-15

## 2020-02-26 ENCOUNTER — OFFICE VISIT (OUTPATIENT)
Dept: FAMILY MEDICINE CLINIC | Facility: CLINIC | Age: 60
End: 2020-02-26
Payer: COMMERCIAL

## 2020-02-26 VITALS
BODY MASS INDEX: 23 KG/M2 | RESPIRATION RATE: 18 BRPM | OXYGEN SATURATION: 97 % | HEART RATE: 59 BPM | DIASTOLIC BLOOD PRESSURE: 70 MMHG | SYSTOLIC BLOOD PRESSURE: 118 MMHG | WEIGHT: 150.81 LBS | TEMPERATURE: 98 F

## 2020-02-26 DIAGNOSIS — R05.9 COUGH: Primary | ICD-10-CM

## 2020-02-26 DIAGNOSIS — F98.8 ATTENTION DEFICIT DISORDER (ADD) WITHOUT HYPERACTIVITY: ICD-10-CM

## 2020-02-26 PROCEDURE — 99213 OFFICE O/P EST LOW 20 MIN: CPT | Performed by: FAMILY MEDICINE

## 2020-02-26 RX ORDER — LOSARTAN POTASSIUM 25 MG/1
25 TABLET ORAL DAILY
Qty: 90 TABLET | Refills: 3 | Status: SHIPPED | OUTPATIENT
Start: 2020-02-26 | End: 2020-10-15

## 2020-02-26 RX ORDER — LISINOPRIL 2.5 MG/1
2.5 TABLET ORAL
Qty: 90 TABLET | Refills: 0 | COMMUNITY
Start: 2020-02-26 | End: 2020-02-26 | Stop reason: SINTOL

## 2020-02-26 NOTE — PROGRESS NOTES
HPI:    Patient ID: Darrell Dawson is a 64year old male. Cough   This is a new problem. The current episode started more than 1 month ago. The problem has been unchanged. The cough is non-productive.  Pertinent negatives include no chest pain, fever normal.   Neurological: He is alert and oriented to person, place, and time. He has normal reflexes. Skin: Skin is warm and dry. ASSESSMENT/PLAN:   Cough  (primary encounter diagnosis)–patient complains of dry tickly cough.   Occurred last wi

## 2020-03-16 ENCOUNTER — NURSE TRIAGE (OUTPATIENT)
Dept: FAMILY MEDICINE CLINIC | Facility: CLINIC | Age: 60
End: 2020-03-16

## 2020-03-16 RX ORDER — ESCITALOPRAM OXALATE 20 MG/1
TABLET ORAL
Qty: 90 TABLET | Refills: 1 | Status: SHIPPED | OUTPATIENT
Start: 2020-03-16 | End: 2020-09-28

## 2020-03-16 NOTE — TELEPHONE ENCOUNTER
Action Requested: Summary for Provider     []  Critical Lab, Recommendations Needed  [] Need Additional Advice  []   FYI    []   Need Orders  [] Need Medications Sent to Pharmacy  []  Other     SUMMARY:     Patient called stating he was switched from [address] [place] [address]

## 2020-05-13 RX ORDER — LORAZEPAM 1 MG/1
TABLET ORAL
Qty: 30 TABLET | Refills: 1 | Status: SHIPPED | OUTPATIENT
Start: 2020-05-13 | End: 2020-07-17

## 2020-07-16 NOTE — TELEPHONE ENCOUNTER
Romy Fuller needs a refill of:             • LORAZEPAM 1 MG Oral Tab TAKE 1 TABLET BY MOUTH AT BEDTIME AS NEEDED 30 tablet 1

## 2020-07-17 RX ORDER — LORAZEPAM 1 MG/1
1 TABLET ORAL NIGHTLY PRN
Qty: 30 TABLET | Refills: 1 | Status: SHIPPED | OUTPATIENT
Start: 2020-07-17 | End: 2020-09-16

## 2020-09-15 NOTE — TELEPHONE ENCOUNTER
Patient is requesting refill of medication LORazepam 1 MG Oral Tab. Patient states he is out of medication.

## 2020-09-16 RX ORDER — LORAZEPAM 1 MG/1
1 TABLET ORAL NIGHTLY PRN
Qty: 30 TABLET | Refills: 1 | Status: SHIPPED | OUTPATIENT
Start: 2020-09-16 | End: 2020-10-15

## 2020-09-28 RX ORDER — ESCITALOPRAM OXALATE 20 MG/1
20 TABLET ORAL DAILY
Qty: 90 TABLET | Refills: 3 | Status: SHIPPED | OUTPATIENT
Start: 2020-09-28 | End: 2021-03-15

## 2020-09-28 NOTE — TELEPHONE ENCOUNTER
-  Please see refill request message.     LOV: 2-  Escitalopram last filled 3-, #90 and 1 refill  Medication pended for your approval.    Thank you Statement Selected

## 2020-10-14 NOTE — TELEPHONE ENCOUNTER
Patient requesting refill for medication ADDERALL and medication below and scheduled an appointment on 10/20 and states almost out of medication. •  LORazepam 1 MG Oral Tab, Take 1 tablet (1 mg total) by mouth nightly as needed.  at bedtime, Disp: 30 ta

## 2020-10-15 RX ORDER — LOSARTAN POTASSIUM 25 MG/1
25 TABLET ORAL DAILY
Qty: 90 TABLET | Refills: 3 | Status: SHIPPED | OUTPATIENT
Start: 2020-10-15 | End: 2021-07-27

## 2020-10-15 RX ORDER — LORAZEPAM 1 MG/1
1 TABLET ORAL NIGHTLY PRN
Qty: 30 TABLET | Refills: 1 | Status: SHIPPED | OUTPATIENT
Start: 2020-10-15 | End: 2021-01-04

## 2020-10-15 RX ORDER — ATORVASTATIN CALCIUM 80 MG/1
80 TABLET, FILM COATED ORAL NIGHTLY
Qty: 90 TABLET | Refills: 3 | Status: SHIPPED | OUTPATIENT
Start: 2020-10-15 | End: 2022-01-07

## 2020-10-20 ENCOUNTER — LAB ENCOUNTER (OUTPATIENT)
Dept: LAB | Age: 60
End: 2020-10-20
Attending: FAMILY MEDICINE
Payer: COMMERCIAL

## 2020-10-20 ENCOUNTER — OFFICE VISIT (OUTPATIENT)
Dept: FAMILY MEDICINE CLINIC | Facility: CLINIC | Age: 60
End: 2020-10-20
Payer: COMMERCIAL

## 2020-10-20 VITALS
WEIGHT: 155.81 LBS | RESPIRATION RATE: 18 BRPM | SYSTOLIC BLOOD PRESSURE: 130 MMHG | DIASTOLIC BLOOD PRESSURE: 76 MMHG | HEIGHT: 67.5 IN | TEMPERATURE: 98 F | BODY MASS INDEX: 24.17 KG/M2 | HEART RATE: 59 BPM

## 2020-10-20 DIAGNOSIS — I25.2 HISTORY OF MI (MYOCARDIAL INFARCTION): ICD-10-CM

## 2020-10-20 DIAGNOSIS — F98.8 ATTENTION DEFICIT DISORDER (ADD) WITHOUT HYPERACTIVITY: ICD-10-CM

## 2020-10-20 DIAGNOSIS — F41.1 GENERALIZED ANXIETY DISORDER: ICD-10-CM

## 2020-10-20 DIAGNOSIS — I10 ESSENTIAL HYPERTENSION: Primary | ICD-10-CM

## 2020-10-20 DIAGNOSIS — Z12.5 PROSTATE CANCER SCREENING: Primary | ICD-10-CM

## 2020-10-20 DIAGNOSIS — E78.5 HYPERLIPIDEMIA, UNSPECIFIED HYPERLIPIDEMIA TYPE: ICD-10-CM

## 2020-10-20 DIAGNOSIS — Z12.5 PROSTATE CANCER SCREENING: ICD-10-CM

## 2020-10-20 PROCEDURE — 3008F BODY MASS INDEX DOCD: CPT | Performed by: FAMILY MEDICINE

## 2020-10-20 PROCEDURE — 90471 IMMUNIZATION ADMIN: CPT | Performed by: FAMILY MEDICINE

## 2020-10-20 PROCEDURE — 99213 OFFICE O/P EST LOW 20 MIN: CPT | Performed by: FAMILY MEDICINE

## 2020-10-20 PROCEDURE — 3078F DIAST BP <80 MM HG: CPT | Performed by: FAMILY MEDICINE

## 2020-10-20 PROCEDURE — 90750 HZV VACC RECOMBINANT IM: CPT | Performed by: FAMILY MEDICINE

## 2020-10-20 PROCEDURE — 3075F SYST BP GE 130 - 139MM HG: CPT | Performed by: FAMILY MEDICINE

## 2020-10-20 PROCEDURE — 36415 COLL VENOUS BLD VENIPUNCTURE: CPT

## 2020-10-20 RX ORDER — DEXTROAMPHETAMINE SACCHARATE, AMPHETAMINE ASPARTATE, DEXTROAMPHETAMINE SULFATE AND AMPHETAMINE SULFATE 2.5; 2.5; 2.5; 2.5 MG/1; MG/1; MG/1; MG/1
10 TABLET ORAL DAILY
Qty: 30 TABLET | Refills: 0 | Status: SHIPPED | OUTPATIENT
Start: 2020-12-21 | End: 2021-11-30

## 2020-10-20 RX ORDER — DEXTROAMPHETAMINE SACCHARATE, AMPHETAMINE ASPARTATE, DEXTROAMPHETAMINE SULFATE AND AMPHETAMINE SULFATE 2.5; 2.5; 2.5; 2.5 MG/1; MG/1; MG/1; MG/1
10 TABLET ORAL DAILY
Qty: 30 TABLET | Refills: 0 | Status: SHIPPED | OUTPATIENT
Start: 2020-11-20 | End: 2020-12-20

## 2020-10-20 RX ORDER — DEXTROAMPHETAMINE SACCHARATE, AMPHETAMINE ASPARTATE, DEXTROAMPHETAMINE SULFATE AND AMPHETAMINE SULFATE 2.5; 2.5; 2.5; 2.5 MG/1; MG/1; MG/1; MG/1
10 TABLET ORAL DAILY
Qty: 30 TABLET | Refills: 0 | Status: SHIPPED | OUTPATIENT
Start: 2020-10-20 | End: 2020-11-19

## 2020-10-20 RX ORDER — CLOPIDOGREL BISULFATE 75 MG/1
75 TABLET ORAL DAILY
Qty: 90 TABLET | Refills: 3 | Status: SHIPPED | OUTPATIENT
Start: 2020-10-20 | End: 2022-01-07

## 2020-10-20 RX ORDER — DEXTROAMPHETAMINE SACCHARATE, AMPHETAMINE ASPARTATE, DEXTROAMPHETAMINE SULFATE AND AMPHETAMINE SULFATE 2.5; 2.5; 2.5; 2.5 MG/1; MG/1; MG/1; MG/1
10 TABLET ORAL DAILY
COMMUNITY
End: 2020-10-20

## 2020-10-20 NOTE — PROGRESS NOTES
HPI:    Patient ID: Shon Ag is a 64year old male. Hypertension  This is a chronic problem. The current episode started more than 1 year ago. The problem is unchanged. The problem is controlled. Associated symptoms include anxiety.  Pertinent He is oriented to person, place, and time. He appears well-developed and well-nourished. Eyes: Pupils are equal, round, and reactive to light. Neck: No JVD present.    Cardiovascular: Normal rate, regular rhythm, normal heart sounds and intact distal pu tablet (75 mg total) by mouth daily. • amphetamine-dextroamphetamine (ADDERALL) 10 MG Oral Tab 30 tablet 0     Sig: Take 1 tablet (10 mg total) by mouth daily.    • amphetamine-dextroamphetamine (ADDERALL) 10 MG Oral Tab 30 tablet 0     Sig: Take 1 tablet

## 2020-10-21 DIAGNOSIS — E78.5 HYPERLIPIDEMIA, UNSPECIFIED HYPERLIPIDEMIA TYPE: Primary | ICD-10-CM

## 2020-10-21 RX ORDER — EZETIMIBE 10 MG/1
10 TABLET ORAL DAILY
Qty: 90 TABLET | Refills: 3 | Status: SHIPPED | OUTPATIENT
Start: 2020-10-21 | End: 2021-10-16

## 2021-01-04 RX ORDER — LORAZEPAM 1 MG/1
1 TABLET ORAL NIGHTLY PRN
Qty: 30 TABLET | Refills: 1 | Status: SHIPPED | OUTPATIENT
Start: 2021-01-04 | End: 2021-04-08

## 2021-03-05 ENCOUNTER — TELEPHONE (OUTPATIENT)
Dept: FAMILY MEDICINE CLINIC | Facility: CLINIC | Age: 61
End: 2021-03-05

## 2021-03-05 NOTE — TELEPHONE ENCOUNTER
Pt calling to get a letter that he falls into the 1B+ category for receiving a COVID vaccine. Informed of local health departments and pharmacies for him to obtain the immunization.  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

## 2021-03-15 ENCOUNTER — LAB ENCOUNTER (OUTPATIENT)
Dept: LAB | Age: 61
End: 2021-03-15
Attending: FAMILY MEDICINE
Payer: COMMERCIAL

## 2021-03-15 ENCOUNTER — OFFICE VISIT (OUTPATIENT)
Dept: FAMILY MEDICINE CLINIC | Facility: CLINIC | Age: 61
End: 2021-03-15
Payer: COMMERCIAL

## 2021-03-15 VITALS
HEART RATE: 49 BPM | TEMPERATURE: 97 F | HEIGHT: 67.5 IN | SYSTOLIC BLOOD PRESSURE: 128 MMHG | BODY MASS INDEX: 24.48 KG/M2 | WEIGHT: 157.81 LBS | RESPIRATION RATE: 18 BRPM | DIASTOLIC BLOOD PRESSURE: 84 MMHG

## 2021-03-15 DIAGNOSIS — F41.1 GENERALIZED ANXIETY DISORDER: ICD-10-CM

## 2021-03-15 DIAGNOSIS — F98.8 ATTENTION DEFICIT DISORDER (ADD) WITHOUT HYPERACTIVITY: ICD-10-CM

## 2021-03-15 DIAGNOSIS — I25.2 HISTORY OF MI (MYOCARDIAL INFARCTION): ICD-10-CM

## 2021-03-15 DIAGNOSIS — Z00.00 ROUTINE PHYSICAL EXAMINATION: Primary | ICD-10-CM

## 2021-03-15 DIAGNOSIS — I10 ESSENTIAL HYPERTENSION: ICD-10-CM

## 2021-03-15 DIAGNOSIS — E78.5 HYPERLIPIDEMIA, UNSPECIFIED HYPERLIPIDEMIA TYPE: ICD-10-CM

## 2021-03-15 DIAGNOSIS — Z71.84 TRAVEL ADVICE ENCOUNTER: ICD-10-CM

## 2021-03-15 LAB
ANION GAP SERPL CALC-SCNC: 0 MMOL/L (ref 0–18)
BUN BLD-MCNC: 14 MG/DL (ref 7–18)
BUN/CREAT SERPL: 15.2 (ref 10–20)
CALCIUM BLD-MCNC: 9.1 MG/DL (ref 8.5–10.1)
CHLORIDE SERPL-SCNC: 105 MMOL/L (ref 98–112)
CHOLEST SMN-MCNC: 157 MG/DL (ref ?–200)
CO2 SERPL-SCNC: 36 MMOL/L (ref 21–32)
CREAT BLD-MCNC: 0.92 MG/DL
GLUCOSE BLD-MCNC: 80 MG/DL (ref 70–99)
HDLC SERPL-MCNC: 41 MG/DL (ref 40–59)
LDLC SERPL CALC-MCNC: 79 MG/DL (ref ?–100)
NONHDLC SERPL-MCNC: 116 MG/DL (ref ?–130)
OSMOLALITY SERPL CALC.SUM OF ELEC: 291 MOSM/KG (ref 275–295)
PATIENT FASTING Y/N/NP: NO
PATIENT FASTING Y/N/NP: NO
POTASSIUM SERPL-SCNC: 4.5 MMOL/L (ref 3.5–5.1)
SODIUM SERPL-SCNC: 141 MMOL/L (ref 136–145)
TRIGL SERPL-MCNC: 185 MG/DL (ref 30–149)
VLDLC SERPL CALC-MCNC: 37 MG/DL (ref 0–30)

## 2021-03-15 PROCEDURE — 80061 LIPID PANEL: CPT | Performed by: FAMILY MEDICINE

## 2021-03-15 PROCEDURE — 99396 PREV VISIT EST AGE 40-64: CPT | Performed by: FAMILY MEDICINE

## 2021-03-15 PROCEDURE — 36415 COLL VENOUS BLD VENIPUNCTURE: CPT | Performed by: FAMILY MEDICINE

## 2021-03-15 PROCEDURE — 3008F BODY MASS INDEX DOCD: CPT | Performed by: FAMILY MEDICINE

## 2021-03-15 PROCEDURE — 3079F DIAST BP 80-89 MM HG: CPT | Performed by: FAMILY MEDICINE

## 2021-03-15 PROCEDURE — 3074F SYST BP LT 130 MM HG: CPT | Performed by: FAMILY MEDICINE

## 2021-03-15 PROCEDURE — 80048 BASIC METABOLIC PNL TOTAL CA: CPT | Performed by: FAMILY MEDICINE

## 2021-03-15 RX ORDER — AZITHROMYCIN 250 MG/1
TABLET, FILM COATED ORAL
Qty: 6 TABLET | Refills: 0 | Status: SHIPPED | OUTPATIENT
Start: 2021-03-15 | End: 2021-03-20

## 2021-03-15 RX ORDER — ESCITALOPRAM OXALATE 10 MG/1
10 TABLET ORAL DAILY
Qty: 90 TABLET | Refills: 1 | Status: SHIPPED | OUTPATIENT
Start: 2021-03-15 | End: 2021-09-18

## 2021-03-15 NOTE — PROGRESS NOTES
HPI/Subjective:   Patient ID: Rick Franklin is a 64year old male. 26-year-old male presents for routine physical.  Additional issues as below. History/Other:   Review of Systems   Constitutional: Negative. Respiratory: Negative.     Cardiov editing, tutoring, writing. Exercising regularly with running. Drinking most days of the week, often 2-5 servings. Discussed reducing or discontinuing alcohol. Encouraged to consider support groups such as AA. Nonfasting labs done today.   Colonoscopy

## 2021-03-25 ENCOUNTER — TELEPHONE (OUTPATIENT)
Dept: FAMILY MEDICINE CLINIC | Facility: CLINIC | Age: 61
End: 2021-03-25

## 2021-03-25 NOTE — TELEPHONE ENCOUNTER
Pt states he is scheduled for his first COVID vaccine this Friday through work. He states he will be going to [facility] and returning 10 days after his 2nd vaccine is due. He is inquiring if he can receive his 2nd vaccine through [facility].  Informed he isn't elg

## 2021-04-08 ENCOUNTER — TELEPHONE (OUTPATIENT)
Dept: FAMILY MEDICINE CLINIC | Facility: CLINIC | Age: 61
End: 2021-04-08

## 2021-04-08 RX ORDER — LORAZEPAM 1 MG/1
1 TABLET ORAL NIGHTLY PRN
Qty: 30 TABLET | Refills: 1 | Status: SHIPPED | OUTPATIENT
Start: 2021-04-08 | End: 2021-05-20

## 2021-04-08 NOTE — TELEPHONE ENCOUNTER
Dr. Eamon Parsons, please see patient message and advise. Thanks. Last rx:    Medication Detail    Medication Quantity Refills Start End   LORazepam 1 MG Oral Tab 30 tablet 1 1/4/2021    Sig:   Take 1 tablet (1 mg total) by mouth nightly as needed.  at bedtime

## 2021-04-08 NOTE — TELEPHONE ENCOUNTER
Per patient he is going to [place] tomorrow and he needs rx med Lorazepam by today. Current Outpatient Medications   Medication Sig Dispense Refill   •    1   • LORazepam 1 MG Oral Tab Take 1 tablet (1 mg total) by mouth nightly as needed.  at [address]

## 2021-04-09 NOTE — TELEPHONE ENCOUNTER
Pharmacy called and advised that patient is there trying to  the [address] [place] today. However, the pharmacy needs Approval, verbal is OK, to be able to release it Early. Patient is leaving for out [place] country later today.       Please Advise Tumor Depth: Less than 6mm from granular layer and no invasion beyond the subcutaneous fat

## 2021-04-09 NOTE — TELEPHONE ENCOUNTER
Spoke with pharmacy. According to our records refill is not early. Pharmacist deep they did release it to pt, because he's going to [place] today.

## 2021-05-20 RX ORDER — LORAZEPAM 1 MG/1
TABLET ORAL
Qty: 30 TABLET | Refills: 0 | Status: SHIPPED | OUTPATIENT
Start: 2021-05-20 | End: 2021-07-29

## 2021-06-10 ENCOUNTER — NURSE TRIAGE (OUTPATIENT)
Dept: FAMILY MEDICINE CLINIC | Facility: CLINIC | Age: 61
End: 2021-06-10

## 2021-06-10 NOTE — TELEPHONE ENCOUNTER
Action Requested: Summary for Provider     []  Critical Lab, Recommendations Needed  [] Need Additional Advice  []   FYI    []   Need Orders  [] Need Medications Sent to Pharmacy  []  Other     Dr. Bette Jerry  Patient has appointment with Dr. Alvino Lemons but prefers

## 2021-06-10 NOTE — TELEPHONE ENCOUNTER
Call attempt. Left Voicemail to call back our office. Phone number provided with office hours. Attempted to offer Res24 appointments on Monday/Tuesday with Dr. Osorio Barnes.      Transfer to Triage

## 2021-06-11 NOTE — TELEPHONE ENCOUNTER
Pt contacted but [facility] cannot make the only Res24 slot left (for Monday/Tues) at this time= 6/15/21 at 10:30 am; [facility] was hoping to get a late appt as has to work.  Pt will just keep current appt with Dr Maribel Nunes for 6/15 at 4:30 pm.

## 2021-07-26 NOTE — TELEPHONE ENCOUNTER
Current Outpatient Medications:   •  LORAZEPAM 1 MG Oral Tab, TAKE 1 TABLET(1 MG) BY MOUTH EVERY NIGHT AT BEDTIME AS NEEDED, Disp: 30 tablet, Rfl: 0

## 2021-07-27 RX ORDER — ATORVASTATIN CALCIUM 80 MG/1
TABLET, FILM COATED ORAL
Qty: 90 TABLET | Refills: 3 | OUTPATIENT
Start: 2021-07-27

## 2021-07-27 RX ORDER — LORAZEPAM 1 MG/1
1 TABLET ORAL NIGHTLY PRN
Qty: 90 TABLET | Refills: 1 | OUTPATIENT
Start: 2021-07-27

## 2021-07-27 RX ORDER — LOSARTAN POTASSIUM 25 MG/1
25 TABLET ORAL DAILY
Qty: 90 TABLET | Refills: 3 | Status: SHIPPED | OUTPATIENT
Start: 2021-07-27 | End: 2022-01-07

## 2021-07-27 RX ORDER — LISINOPRIL 2.5 MG/1
TABLET ORAL
Qty: 90 TABLET | Refills: 3 | OUTPATIENT
Start: 2021-07-27

## 2021-07-27 NOTE — TELEPHONE ENCOUNTER
See also see Dr. Nestora Lanes message below. Verifying medications. Left message to call back.  Transfer to triage    Name from pharmacy: LISINOPRIL 2.5MG TABLETS          Will file in chart as: LISINOPRIL 2.5 MG Oral Tab    Sig: TAKE 1 TABLET BY MOUTH CHASTITY

## 2021-07-27 NOTE — TELEPHONE ENCOUNTER
Please contact patient let him know #[phone] sent with 1 refill 2 months ago. If he is needing additional refill at this time please give acute visit to discuss.

## 2021-07-27 NOTE — TELEPHONE ENCOUNTER
The patient was called and informed with understanding. Appointment made and was informed he is out of network. Discussed with the call center and he either needs to change to 312 or find a doctor in [place]. [address].    The patient was informed and he will ca

## 2021-07-27 NOTE — TELEPHONE ENCOUNTER
Please check with patient, at last visit I had documented he was on losartan on lisinopril.   Also see message same day on lorazepam refill request.

## 2021-07-29 RX ORDER — LORAZEPAM 1 MG/1
1 TABLET ORAL NIGHTLY PRN
Qty: 30 TABLET | Refills: 1 | Status: SHIPPED | OUTPATIENT
Start: 2021-07-29

## 2021-07-29 NOTE — TELEPHONE ENCOUNTER
The patient is not calling us back. The patient has an appointment to see Dr. Ralph Otero on 8/3/21. Can discuss at the office visit.

## 2021-07-29 NOTE — TELEPHONE ENCOUNTER
•  LORAZEPAM 1 MG Oral Tab, TAKE 1 TABLET(1 MG) BY MOUTH EVERY NIGHT AT BEDTIME AS NEEDED, Disp: 30 tablet, Rfl: 0

## 2021-08-03 NOTE — PROGRESS NOTES
HPI/Subjective:   Patient ID: Deshawn Draper is a 64year old male. Hypertension  This is a chronic problem. The current episode started more than 1 year ago. The problem is unchanged. The problem is controlled.  Pertinent negatives include no chest been taking nightly. Started edible cannabis (high CBD/THC ratio). It has been effective in improving sleep. No adverse effects. Agree with medical cannabis card. Application done today.     PTSD (post-traumatic stress disorder)–has been in therapy, co

## 2021-09-18 RX ORDER — ESCITALOPRAM OXALATE 10 MG/1
10 TABLET ORAL DAILY
Qty: 90 TABLET | Refills: 1 | Status: SHIPPED | OUTPATIENT
Start: 2021-09-18 | End: 2022-03-28

## 2021-09-18 NOTE — TELEPHONE ENCOUNTER
Refill passed per [facility] protocol.     Requested Prescriptions   Pending Prescriptions Disp Refills    ESCITALOPRAM 10 MG Oral Tab [Pharmacy Med Name: ESCITALOPRAM 10MG TABLETS] 90 tablet 1     Sig: TAKE 1 TABLET(10 MG) BY MOUTH DAILY        Psychiatric Non-Scheduled (Anti-Anxiety) Passed - 9/18/2021  3:12 AM        Passed - Appointment in last 6 or next 3 months              Recent Outpatient Visits              1 month ago Generalized anxiety disorder    [facility], Gini Kathleen, Deny Castaneda MD    Office Visit    6 months ago Routine physical examination    [facility], Ama Maddox MD    Office Visit    11 months ago Essential hypertension    [facility], Ama Maddox MD    Office Visit    1 year ago Cough    [facility], Ama Maddox MD    Office Visit    1 year ago Attention deficit disorder (ADD) without hyperactivity    [facility], Ama Maddox MD    Office Visit

## 2021-10-05 ENCOUNTER — TELEPHONE (OUTPATIENT)
Dept: FAMILY MEDICINE CLINIC | Facility: CLINIC | Age: 61
End: 2021-10-05

## 2021-11-01 ENCOUNTER — HOSPITAL ENCOUNTER (OUTPATIENT)
Dept: GENERAL RADIOLOGY | Age: 61
Discharge: HOME | End: 2021-11-01
Attending: FAMILY MEDICINE
Payer: COMMERCIAL

## 2021-11-01 ENCOUNTER — OFFICE VISIT (OUTPATIENT)
Dept: FAMILY MEDICINE CLINIC | Facility: CLINIC | Age: 61
End: 2021-11-01
Payer: COMMERCIAL

## 2021-11-01 VITALS
RESPIRATION RATE: 18 BRPM | SYSTOLIC BLOOD PRESSURE: 113 MMHG | WEIGHT: 150 LBS | DIASTOLIC BLOOD PRESSURE: 65 MMHG | HEART RATE: 74 BPM | BODY MASS INDEX: 23.27 KG/M2 | HEIGHT: 67.5 IN | TEMPERATURE: 98 F

## 2021-11-01 DIAGNOSIS — I25.2 HISTORY OF MI (MYOCARDIAL INFARCTION): ICD-10-CM

## 2021-11-01 DIAGNOSIS — M25.511 RIGHT SHOULDER PAIN, UNSPECIFIED CHRONICITY: ICD-10-CM

## 2021-11-01 DIAGNOSIS — M25.511 RIGHT SHOULDER PAIN, UNSPECIFIED CHRONICITY: Primary | ICD-10-CM

## 2021-11-01 PROCEDURE — 90471 IMMUNIZATION ADMIN: CPT | Performed by: FAMILY MEDICINE

## 2021-11-01 PROCEDURE — 3074F SYST BP LT 130 MM HG: CPT | Performed by: FAMILY MEDICINE

## 2021-11-01 PROCEDURE — 90686 IIV4 VACC NO PRSV 0.5 ML IM: CPT | Performed by: FAMILY MEDICINE

## 2021-11-01 PROCEDURE — 3078F DIAST BP <80 MM HG: CPT | Performed by: FAMILY MEDICINE

## 2021-11-01 PROCEDURE — 3008F BODY MASS INDEX DOCD: CPT | Performed by: FAMILY MEDICINE

## 2021-11-01 PROCEDURE — 73030 X-RAY EXAM OF SHOULDER: CPT | Performed by: FAMILY MEDICINE

## 2021-11-01 PROCEDURE — 99213 OFFICE O/P EST LOW 20 MIN: CPT | Performed by: FAMILY MEDICINE

## 2021-11-01 NOTE — PROGRESS NOTES
Subjective:   Patient ID: Curt Kapadia is a 63year old male. Shoulder Pain   The pain is present in the right shoulder. This is a new problem. The current episode started more than 1 month ago. The problem occurs daily.  The quality of the pain is activities. Suspect rotator cuff injury with impingement. Recommend x-ray. Referred to physical therapy  If no improvement in 1 month recommend orthopedic evaluation. History of MI (myocardial infarction)–due for follow-up with Dr. Raghu Gross.     Orders Pl

## 2021-11-30 ENCOUNTER — OFFICE VISIT (OUTPATIENT)
Dept: FAMILY MEDICINE CLINIC | Facility: CLINIC | Age: 61
End: 2021-11-30
Payer: COMMERCIAL

## 2021-11-30 VITALS
SYSTOLIC BLOOD PRESSURE: 131 MMHG | HEART RATE: 60 BPM | RESPIRATION RATE: 18 BRPM | HEIGHT: 67.5 IN | WEIGHT: 151 LBS | BODY MASS INDEX: 23.42 KG/M2 | DIASTOLIC BLOOD PRESSURE: 75 MMHG | TEMPERATURE: 97 F

## 2021-11-30 DIAGNOSIS — F98.8 ATTENTION DEFICIT DISORDER (ADD) WITHOUT HYPERACTIVITY: ICD-10-CM

## 2021-11-30 DIAGNOSIS — Z00.00 NORMAL CARDIAC EXAM: ICD-10-CM

## 2021-11-30 DIAGNOSIS — F43.10 PTSD (POST-TRAUMATIC STRESS DISORDER): ICD-10-CM

## 2021-11-30 DIAGNOSIS — F41.1 GENERALIZED ANXIETY DISORDER: ICD-10-CM

## 2021-11-30 DIAGNOSIS — M25.511 RIGHT SHOULDER PAIN, UNSPECIFIED CHRONICITY: Primary | ICD-10-CM

## 2021-11-30 PROCEDURE — 3075F SYST BP GE 130 - 139MM HG: CPT | Performed by: FAMILY MEDICINE

## 2021-11-30 PROCEDURE — 3008F BODY MASS INDEX DOCD: CPT | Performed by: FAMILY MEDICINE

## 2021-11-30 PROCEDURE — 99213 OFFICE O/P EST LOW 20 MIN: CPT | Performed by: FAMILY MEDICINE

## 2021-11-30 PROCEDURE — 3078F DIAST BP <80 MM HG: CPT | Performed by: FAMILY MEDICINE

## 2021-11-30 RX ORDER — DEXTROAMPHETAMINE SACCHARATE, AMPHETAMINE ASPARTATE, DEXTROAMPHETAMINE SULFATE AND AMPHETAMINE SULFATE 2.5; 2.5; 2.5; 2.5 MG/1; MG/1; MG/1; MG/1
10 TABLET ORAL DAILY
Qty: 30 TABLET | Refills: 0 | Status: SHIPPED | OUTPATIENT
Start: 2021-11-30 | End: 2021-12-30

## 2021-12-01 NOTE — PROGRESS NOTES
Subjective:   Patient ID: Rolan Jane is a 63year old male. Patient presents for follow-up on right shoulder injury, anxiety and ADD as below.       History/Other:   Review of Systems  Current Outpatient Medications   Medication Sig Dispense Refi responded in the past.  Certification for medical cannabis done today. PTSD (post-traumatic stress disorder)–patient has had appropriate medical and cognitive behavioral therapy.   Persistent symptoms have responded in the past.  Certification for medica

## 2021-12-10 ENCOUNTER — OFFICE VISIT (OUTPATIENT)
Dept: ORTHOPEDICS CLINIC | Facility: CLINIC | Age: 61
End: 2021-12-10
Payer: COMMERCIAL

## 2021-12-10 DIAGNOSIS — M67.911 DISORDER OF RIGHT ROTATOR CUFF: Primary | ICD-10-CM

## 2021-12-10 PROCEDURE — 99243 OFF/OP CNSLTJ NEW/EST LOW 30: CPT | Performed by: ORTHOPAEDIC SURGERY

## 2021-12-10 NOTE — H&P
NURSING INTAKE COMMENTS: Patient presents with:  Shoulder Pain: Right - onset many years ago with pain on and off - in July he jerked his arm and pain got worse and is rated as 7-9/92 with certain movements - - pt ie R handed - no numbness or tingling - ha Cigarettes      Smokeless tobacco: Never Used    Substance and Sexual Activity      Alcohol use: Yes        Comment: one to two drinks / day      Drug use: No      Sexual activity: Yes        Partners: Female       Review of Systems:  GENERAL: feels genera Lab Results   Component Value Date    GLU 80 03/15/2021    BUN 14 03/15/2021    CREATSERUM 0.92 03/15/2021    GFRNAA 91 03/15/2021    GFRAA 106 03/15/2021        Assessment and Plan:  Diagnoses and all orders for this visit:    Disorder of right rotator

## 2021-12-20 NOTE — TELEPHONE ENCOUNTER
Please let patient know refill done but must schedule for routine physical,  And follow-up on this medication. Spoke to pt informed her of message    She stated she doesn't have a refill for the Prozac 40mg    Medication pending

## 2022-01-08 ENCOUNTER — HOSPITAL ENCOUNTER (OUTPATIENT)
Dept: MRI IMAGING | Facility: HOSPITAL | Age: 62
Discharge: HOME | End: 2022-01-08
Attending: ORTHOPAEDIC SURGERY
Payer: COMMERCIAL

## 2022-01-08 DIAGNOSIS — M67.911 DISORDER OF RIGHT ROTATOR CUFF: ICD-10-CM

## 2022-01-08 PROCEDURE — 73221 MRI JOINT UPR EXTREM W/O DYE: CPT | Performed by: ORTHOPAEDIC SURGERY

## 2022-01-21 ENCOUNTER — OFFICE VISIT (OUTPATIENT)
Dept: ORTHOPEDICS CLINIC | Facility: CLINIC | Age: 62
End: 2022-01-21
Payer: COMMERCIAL

## 2022-01-21 DIAGNOSIS — M12.811 RIGHT ROTATOR CUFF TEAR ARTHROPATHY: Primary | ICD-10-CM

## 2022-01-21 DIAGNOSIS — M75.121 NONTRAUMATIC COMPLETE TEAR OF RIGHT ROTATOR CUFF: ICD-10-CM

## 2022-01-21 DIAGNOSIS — M75.101 RIGHT ROTATOR CUFF TEAR ARTHROPATHY: Primary | ICD-10-CM

## 2022-01-21 PROCEDURE — 99213 OFFICE O/P EST LOW 20 MIN: CPT | Performed by: ORTHOPAEDIC SURGERY

## 2022-01-21 NOTE — PROGRESS NOTES
NURSING INTAKE COMMENTS: Patient presents with:  Results: here to discuss right shoulder MRI done on 1/8/22. denies any pain at this time.       HPI: This 63year old male presents today for follow-up evaluation of his right shoulder and to discuss MRI resu Substance and Sexual Activity      Alcohol use: Yes        Comment: one to two drinks / day      Drug use: No      Sexual activity: Yes        Partners: Female       Review of Systems:  GENERAL: feels generally well, no recent fevers or chills, no signific joint osteoarthritis. Small shoulder joint effusion. * Labral degeneration and SLAP tear with posterior extension to about 8-9 o'clock. .  * Long head biceps tendinosis with interstitial partial tear.   OTHER: Resorptive cystic changes posterolateral humer allow him to return to full activity. He is interested in surgery and I discussed the options of rotator cuff repair, superior capsular reconstruction and reverse total shoulder arthroplasty.   I recommended shoulder arthroplasty is the most reliable optio

## 2022-01-28 ENCOUNTER — TELEPHONE (OUTPATIENT)
Dept: ORTHOPEDICS CLINIC | Facility: CLINIC | Age: 62
End: 2022-01-28

## 2022-01-28 NOTE — TELEPHONE ENCOUNTER
CT has been approved. Auth # [id number] is valid from 01/28/2022 to 3/28/2022 at [facility]. I updated Shkashmira via referral message.

## 2022-01-28 NOTE — TELEPHONE ENCOUNTER
Aretha Both From [facility] verification states patient is scheduled for tomorrow and needs prior authorization by today. States its a confidential line.  Please advise

## 2022-01-29 ENCOUNTER — HOSPITAL ENCOUNTER (OUTPATIENT)
Dept: CT IMAGING | Facility: HOSPITAL | Age: 62
Discharge: HOME | End: 2022-01-29
Attending: ORTHOPAEDIC SURGERY
Payer: COMMERCIAL

## 2022-01-29 DIAGNOSIS — M12.811 RIGHT ROTATOR CUFF TEAR ARTHROPATHY: ICD-10-CM

## 2022-01-29 DIAGNOSIS — M75.121 NONTRAUMATIC COMPLETE TEAR OF RIGHT ROTATOR CUFF: ICD-10-CM

## 2022-01-29 DIAGNOSIS — M75.101 RIGHT ROTATOR CUFF TEAR ARTHROPATHY: ICD-10-CM

## 2022-01-29 PROCEDURE — 73200 CT UPPER EXTREMITY W/O DYE: CPT | Performed by: ORTHOPAEDIC SURGERY

## 2022-01-31 ENCOUNTER — TELEPHONE (OUTPATIENT)
Dept: ORTHOPEDICS CLINIC | Facility: CLINIC | Age: 62
End: 2022-01-31

## 2022-01-31 NOTE — TELEPHONE ENCOUNTER
Patient called and updated [facility] insurance, old plan ends today, new plan starts tomorrow, thanks.

## 2022-02-01 ENCOUNTER — TELEPHONE (OUTPATIENT)
Dept: FAMILY MEDICINE CLINIC | Facility: CLINIC | Age: 62
End: 2022-02-01

## 2022-02-01 NOTE — TELEPHONE ENCOUNTER
Patient is having trouble accessing the website for his medical cannabis. He said that Dr. Rosa Reardon helped him in the past but he can't get in there.      Routing to Dr. Rsoa Reardon and [facility] clinical staff for assist.

## 2022-02-03 NOTE — TELEPHONE ENCOUNTER
Tried to call patient back for surgery dates. No answer and not able to leave a voicemail.  I will send patient a [employer] message

## 2022-02-04 ENCOUNTER — TELEPHONE (OUTPATIENT)
Dept: ORTHOPEDICS CLINIC | Facility: CLINIC | Age: 62
End: 2022-02-04

## 2022-02-04 ENCOUNTER — TELEPHONE (OUTPATIENT)
Dept: FAMILY MEDICINE CLINIC | Facility: CLINIC | Age: 62
End: 2022-02-04

## 2022-02-04 NOTE — TELEPHONE ENCOUNTER
Patient requesting a pre-op appointment no open availability at this time. Patient is scheduled for surgery March 24, 2022.

## 2022-02-16 ENCOUNTER — TELEPHONE (OUTPATIENT)
Dept: ORTHOPEDICS CLINIC | Facility: CLINIC | Age: 62
End: 2022-02-16

## 2022-02-16 NOTE — TELEPHONE ENCOUNTER
Type of surgery:  RIGHT REVERSE TOTAL SHOULDER ARTHROPLASTY  Date:  3/24/22  Location:  [address]  Medical Clearance:      *Medical: YES      *Dental:   YES      *Other:  TONIER REVERSE TOTAL SHOULDER  Prior Authorization Status:  PENDING  Workers Comp:  Medacta/Aneesh:  Keller:  YES  POV:  4/8/22

## 2022-03-15 ENCOUNTER — OFFICE VISIT (OUTPATIENT)
Dept: FAMILY MEDICINE CLINIC | Facility: CLINIC | Age: 62
End: 2022-03-15
Payer: COMMERCIAL

## 2022-03-15 VITALS
TEMPERATURE: 97 F | HEIGHT: 68 IN | BODY MASS INDEX: 23.04 KG/M2 | HEART RATE: 44 BPM | SYSTOLIC BLOOD PRESSURE: 134 MMHG | WEIGHT: 152 LBS | RESPIRATION RATE: 17 BRPM | DIASTOLIC BLOOD PRESSURE: 78 MMHG

## 2022-03-15 DIAGNOSIS — M75.101 ROTATOR CUFF TEAR ARTHROPATHY OF RIGHT SHOULDER: ICD-10-CM

## 2022-03-15 DIAGNOSIS — M12.811 ROTATOR CUFF TEAR ARTHROPATHY OF RIGHT SHOULDER: ICD-10-CM

## 2022-03-15 DIAGNOSIS — Z01.818 PREOP EXAMINATION: Primary | ICD-10-CM

## 2022-03-15 DIAGNOSIS — M19.011 OSTEOARTHRITIS OF RIGHT SHOULDER, UNSPECIFIED OSTEOARTHRITIS TYPE: ICD-10-CM

## 2022-03-15 DIAGNOSIS — I25.2 HISTORY OF MYOCARDIAL INFARCTION: ICD-10-CM

## 2022-03-15 PROCEDURE — 93000 ELECTROCARDIOGRAM COMPLETE: CPT | Performed by: FAMILY MEDICINE

## 2022-03-15 PROCEDURE — 3075F SYST BP GE 130 - 139MM HG: CPT | Performed by: FAMILY MEDICINE

## 2022-03-15 PROCEDURE — 99214 OFFICE O/P EST MOD 30 MIN: CPT | Performed by: FAMILY MEDICINE

## 2022-03-15 PROCEDURE — 3078F DIAST BP <80 MM HG: CPT | Performed by: FAMILY MEDICINE

## 2022-03-15 PROCEDURE — 3008F BODY MASS INDEX DOCD: CPT | Performed by: FAMILY MEDICINE

## 2022-03-16 ENCOUNTER — TELEPHONE (OUTPATIENT)
Dept: ORTHOPEDICS CLINIC | Facility: CLINIC | Age: 62
End: 2022-03-16

## 2022-03-16 NOTE — TELEPHONE ENCOUNTER
Patient states he is returning a call from Monica Funk, regarding surgery. States she was not aware he needs cardiac clearance.  Please advise

## 2022-03-17 ENCOUNTER — LAB ENCOUNTER (OUTPATIENT)
Dept: LAB | Age: 62
End: 2022-03-17
Attending: FAMILY MEDICINE
Payer: COMMERCIAL

## 2022-03-17 ENCOUNTER — TELEPHONE (OUTPATIENT)
Dept: FAMILY MEDICINE CLINIC | Facility: CLINIC | Age: 62
End: 2022-03-17

## 2022-03-17 DIAGNOSIS — Z01.818 PREOP EXAMINATION: ICD-10-CM

## 2022-03-17 LAB
ALBUMIN SERPL-MCNC: 4.1 G/DL (ref 3.4–5)
ALBUMIN/GLOB SERPL: 1.5 {RATIO} (ref 1–2)
ALP LIVER SERPL-CCNC: 96 U/L
ALT SERPL-CCNC: 33 U/L
ANION GAP SERPL CALC-SCNC: 4 MMOL/L (ref 0–18)
APTT PPP: 30.4 SECONDS (ref 23.3–35.6)
AST SERPL-CCNC: 23 U/L (ref 15–37)
BASOPHILS # BLD AUTO: 0.02 X10(3) UL (ref 0–0.2)
BASOPHILS NFR BLD AUTO: 0.3 %
BILIRUB SERPL-MCNC: 0.5 MG/DL (ref 0.1–2)
BILIRUB UR QL: NEGATIVE
BUN BLD-MCNC: 18 MG/DL (ref 7–18)
BUN/CREAT SERPL: 17.5 (ref 10–20)
CALCIUM BLD-MCNC: 8.7 MG/DL (ref 8.5–10.1)
CHLORIDE SERPL-SCNC: 104 MMOL/L (ref 98–112)
CLARITY UR: CLEAR
CO2 SERPL-SCNC: 30 MMOL/L (ref 21–32)
COLOR UR: YELLOW
CREAT BLD-MCNC: 1.03 MG/DL
DEPRECATED RDW RBC AUTO: 38.7 FL (ref 35.1–46.3)
EOSINOPHIL # BLD AUTO: 0.27 X10(3) UL (ref 0–0.7)
EOSINOPHIL NFR BLD AUTO: 3.5 %
ERYTHROCYTE [DISTWIDTH] IN BLOOD BY AUTOMATED COUNT: 11.9 % (ref 11–15)
FASTING STATUS PATIENT QL REPORTED: NO
GLOBULIN PLAS-MCNC: 2.8 G/DL (ref 2.8–4.4)
GLUCOSE UR-MCNC: NEGATIVE MG/DL
HCT VFR BLD AUTO: 45.1 %
HGB BLD-MCNC: 14.9 G/DL
HGB UR QL STRIP.AUTO: NEGATIVE
IMM GRANULOCYTES # BLD AUTO: 0.03 X10(3) UL (ref 0–1)
IMM GRANULOCYTES NFR BLD: 0.4 %
INR BLD: 0.92 (ref 0.8–1.2)
KETONES UR-MCNC: NEGATIVE MG/DL
LEUKOCYTE ESTERASE UR QL STRIP.AUTO: NEGATIVE
LYMPHOCYTES # BLD AUTO: 1.94 X10(3) UL (ref 1–4)
LYMPHOCYTES NFR BLD AUTO: 25 %
MCH RBC QN AUTO: 29.8 PG (ref 26–34)
MCHC RBC AUTO-ENTMCNC: 33 G/DL (ref 31–37)
MCV RBC AUTO: 90.2 FL
MONOCYTES # BLD AUTO: 0.8 X10(3) UL (ref 0.1–1)
MONOCYTES NFR BLD AUTO: 10.3 %
NEUTROPHILS # BLD AUTO: 4.71 X10 (3) UL (ref 1.5–7.7)
NEUTROPHILS # BLD AUTO: 4.71 X10(3) UL (ref 1.5–7.7)
NEUTROPHILS NFR BLD AUTO: 60.5 %
NITRITE UR QL STRIP.AUTO: NEGATIVE
OSMOLALITY SERPL CALC.SUM OF ELEC: 288 MOSM/KG (ref 275–295)
PH UR: 6 [PH] (ref 5–8)
PLATELET # BLD AUTO: 282 10(3)UL (ref 150–450)
POTASSIUM SERPL-SCNC: 3.9 MMOL/L (ref 3.5–5.1)
PROT SERPL-MCNC: 6.9 G/DL (ref 6.4–8.2)
PROT UR-MCNC: NEGATIVE MG/DL
PROTHROMBIN TIME: 12.4 SECONDS (ref 11.6–14.8)
RBC # BLD AUTO: 5 X10(6)UL
SODIUM SERPL-SCNC: 138 MMOL/L (ref 136–145)
SP GR UR STRIP: 1.02 (ref 1–1.03)
UROBILINOGEN UR STRIP-ACNC: <2
VIT C UR-MCNC: NEGATIVE MG/DL
WBC # BLD AUTO: 7.8 X10(3) UL (ref 4–11)

## 2022-03-17 PROCEDURE — 85610 PROTHROMBIN TIME: CPT

## 2022-03-17 PROCEDURE — 87086 URINE CULTURE/COLONY COUNT: CPT

## 2022-03-17 PROCEDURE — 80053 COMPREHEN METABOLIC PANEL: CPT

## 2022-03-17 PROCEDURE — 81003 URINALYSIS AUTO W/O SCOPE: CPT

## 2022-03-17 PROCEDURE — 36415 COLL VENOUS BLD VENIPUNCTURE: CPT

## 2022-03-17 PROCEDURE — 85025 COMPLETE CBC W/AUTO DIFF WBC: CPT

## 2022-03-17 PROCEDURE — 85730 THROMBOPLASTIN TIME PARTIAL: CPT

## 2022-03-17 NOTE — TELEPHONE ENCOUNTER
Chuyita Corey spoke with patient yesterday 3/16/22.  He spoke with his PCP and will schedule an appointment with his Cardiologist

## 2022-03-17 NOTE — TELEPHONE ENCOUNTER
Received call from cardiologist who reviewed EKG with bradycardia. Recommending check potassium, check Holter monitor. When these results are available he will likely write note clearing for surgery. Left message for patient with this information, recommend he schedule Holter monitor but call our office if any difficulty scheduling in timely fashion.

## 2022-03-18 ENCOUNTER — PATIENT MESSAGE (OUTPATIENT)
Dept: ORTHOPEDICS CLINIC | Facility: CLINIC | Age: 62
End: 2022-03-18

## 2022-03-18 NOTE — TELEPHONE ENCOUNTER
Advised patient of  [facility] notes. Patient verbalized understanding and got the message. Holter monitor scheduled for 3/21/2022, will keep that appointment.

## 2022-03-18 NOTE — TELEPHONE ENCOUNTER
Recv'd Dental Clearance    Not cleared by PCP, needs Cardiac Clearance    Cardiologist - 48hr monitor to be placed 3/21

## 2022-03-21 ENCOUNTER — LAB ENCOUNTER (OUTPATIENT)
Dept: LAB | Facility: HOSPITAL | Age: 62
End: 2022-03-21
Attending: ORTHOPAEDIC SURGERY
Payer: COMMERCIAL

## 2022-03-21 ENCOUNTER — HOSPITAL ENCOUNTER (OUTPATIENT)
Dept: CV DIAGNOSTICS | Facility: HOSPITAL | Age: 62
Discharge: HOME | End: 2022-03-21
Attending: FAMILY MEDICINE
Payer: COMMERCIAL

## 2022-03-21 DIAGNOSIS — Z01.818 PRE-OP TESTING: ICD-10-CM

## 2022-03-21 DIAGNOSIS — R00.1 BRADYCARDIA: ICD-10-CM

## 2022-03-21 LAB — SARS-COV-2 RNA RESP QL NAA+PROBE: NOT DETECTED

## 2022-03-21 PROCEDURE — 93225 XTRNL ECG REC<48 HRS REC: CPT | Performed by: FAMILY MEDICINE

## 2022-03-21 PROCEDURE — 93227 XTRNL ECG REC<48 HR R&I: CPT | Performed by: FAMILY MEDICINE

## 2022-03-21 NOTE — TELEPHONE ENCOUNTER
Recernestina'd a phone call from [facility] this morning. Patient is scheduled with Cardiologist this afternoon but the Stress test is for 3/30/22. Called and spoke with Dr Roderick Lisa office. They state that the Stress test is for the 22nd. Informed her that someone must of rescheduled it, it is now for the 30th, patient is to have surgery on 3/24/22. She informed me that the Stress test was scheduled just in case.

## 2022-03-24 ENCOUNTER — ANESTHESIA (OUTPATIENT)
Dept: SURGERY | Facility: HOSPITAL | Age: 62
End: 2022-03-24
Payer: COMMERCIAL

## 2022-03-24 ENCOUNTER — HOSPITAL ENCOUNTER (OUTPATIENT)
Facility: HOSPITAL | Age: 62
Discharge: HOME | End: 2022-03-25
Attending: ORTHOPAEDIC SURGERY | Admitting: ORTHOPAEDIC SURGERY
Payer: COMMERCIAL

## 2022-03-24 ENCOUNTER — ANESTHESIA EVENT (OUTPATIENT)
Dept: SURGERY | Facility: HOSPITAL | Age: 62
End: 2022-03-24
Payer: COMMERCIAL

## 2022-03-24 ENCOUNTER — APPOINTMENT (OUTPATIENT)
Dept: GENERAL RADIOLOGY | Facility: HOSPITAL | Age: 62
End: 2022-03-24
Attending: ORTHOPAEDIC SURGERY
Payer: COMMERCIAL

## 2022-03-24 DIAGNOSIS — M12.811 RIGHT ROTATOR CUFF TEAR ARTHROPATHY: ICD-10-CM

## 2022-03-24 DIAGNOSIS — Z01.818 PRE-OP TESTING: Primary | ICD-10-CM

## 2022-03-24 DIAGNOSIS — M75.101 RIGHT ROTATOR CUFF TEAR ARTHROPATHY: ICD-10-CM

## 2022-03-24 PROBLEM — I25.10 CAD (CORONARY ARTERY DISEASE): Chronic | Status: ACTIVE | Noted: 2022-03-24

## 2022-03-24 PROBLEM — I10 ESSENTIAL HYPERTENSION: Chronic | Status: ACTIVE | Noted: 2022-03-24

## 2022-03-24 PROCEDURE — 23472 RECONSTRUCT SHOULDER JOINT: CPT | Performed by: ORTHOPAEDIC SURGERY

## 2022-03-24 PROCEDURE — 0RRJ00Z REPLACEMENT OF RIGHT SHOULDER JOINT WITH REVERSE BALL AND SOCKET SYNTHETIC SUBSTITUTE, OPEN APPROACH: ICD-10-PCS | Performed by: ORTHOPAEDIC SURGERY

## 2022-03-24 PROCEDURE — 73030 X-RAY EXAM OF SHOULDER: CPT | Performed by: ORTHOPAEDIC SURGERY

## 2022-03-24 PROCEDURE — 99204 OFFICE O/P NEW MOD 45 MIN: CPT | Performed by: HOSPITALIST

## 2022-03-24 DEVICE — BP GLEN +3MM 25MM SHLDR: Type: IMPLANTABLE DEVICE | Site: SHOULDER | Status: FUNCTIONAL

## 2022-03-24 DEVICE — IMPLANTABLE DEVICE: Type: IMPLANTABLE DEVICE | Site: SHOULDER | Status: FUNCTIONAL

## 2022-03-24 DEVICE — SCREW BP 6.5MM 35MM  GLEN: Type: IMPLANTABLE DEVICE | Site: SHOULDER | Status: FUNCTIONAL

## 2022-03-24 RX ORDER — NALOXONE HYDROCHLORIDE 0.4 MG/ML
80 INJECTION, SOLUTION INTRAMUSCULAR; INTRAVENOUS; SUBCUTANEOUS AS NEEDED
Status: DISCONTINUED | OUTPATIENT
Start: 2022-03-24 | End: 2022-03-24 | Stop reason: HOSPADM

## 2022-03-24 RX ORDER — NEOSTIGMINE METHYLSULFATE 1 MG/ML
INJECTION INTRAVENOUS AS NEEDED
Status: DISCONTINUED | OUTPATIENT
Start: 2022-03-24 | End: 2022-03-24 | Stop reason: SURG

## 2022-03-24 RX ORDER — TRANEXAMIC ACID 10 MG/ML
INJECTION, SOLUTION INTRAVENOUS AS NEEDED
Status: DISCONTINUED | OUTPATIENT
Start: 2022-03-24 | End: 2022-03-24 | Stop reason: SURG

## 2022-03-24 RX ORDER — ROCURONIUM BROMIDE 10 MG/ML
INJECTION, SOLUTION INTRAVENOUS AS NEEDED
Status: DISCONTINUED | OUTPATIENT
Start: 2022-03-24 | End: 2022-03-24 | Stop reason: SURG

## 2022-03-24 RX ORDER — EPHEDRINE SULFATE 50 MG/ML
INJECTION, SOLUTION INTRAVENOUS AS NEEDED
Status: DISCONTINUED | OUTPATIENT
Start: 2022-03-24 | End: 2022-03-24 | Stop reason: SURG

## 2022-03-24 RX ORDER — HYDROCODONE BITARTRATE AND ACETAMINOPHEN 5; 325 MG/1; MG/1
2 TABLET ORAL EVERY 4 HOURS PRN
Status: DISCONTINUED | OUTPATIENT
Start: 2022-03-24 | End: 2022-03-25

## 2022-03-24 RX ORDER — KETOROLAC TROMETHAMINE 30 MG/ML
30 INJECTION, SOLUTION INTRAMUSCULAR; INTRAVENOUS EVERY 6 HOURS
Status: COMPLETED | OUTPATIENT
Start: 2022-03-24 | End: 2022-03-25

## 2022-03-24 RX ORDER — SODIUM PHOSPHATE, DIBASIC AND SODIUM PHOSPHATE, MONOBASIC 7; 19 G/133ML; G/133ML
1 ENEMA RECTAL ONCE AS NEEDED
Status: DISCONTINUED | OUTPATIENT
Start: 2022-03-24 | End: 2022-03-25

## 2022-03-24 RX ORDER — MELATONIN
3 NIGHTLY PRN
Status: DISCONTINUED | OUTPATIENT
Start: 2022-03-24 | End: 2022-03-25

## 2022-03-24 RX ORDER — MORPHINE SULFATE 10 MG/ML
6 INJECTION, SOLUTION INTRAMUSCULAR; INTRAVENOUS EVERY 10 MIN PRN
Status: DISCONTINUED | OUTPATIENT
Start: 2022-03-24 | End: 2022-03-24 | Stop reason: HOSPADM

## 2022-03-24 RX ORDER — DEXAMETHASONE SODIUM PHOSPHATE 10 MG/ML
INJECTION, SOLUTION INTRAMUSCULAR; INTRAVENOUS
Status: COMPLETED | OUTPATIENT
Start: 2022-03-24 | End: 2022-03-24

## 2022-03-24 RX ORDER — CEFAZOLIN SODIUM/WATER 2 G/20 ML
2 SYRINGE (ML) INTRAVENOUS ONCE
Status: DISCONTINUED | OUTPATIENT
Start: 2022-03-24 | End: 2022-03-24 | Stop reason: HOSPADM

## 2022-03-24 RX ORDER — HYDROMORPHONE HYDROCHLORIDE 1 MG/ML
0.4 INJECTION, SOLUTION INTRAMUSCULAR; INTRAVENOUS; SUBCUTANEOUS EVERY 5 MIN PRN
Status: DISCONTINUED | OUTPATIENT
Start: 2022-03-24 | End: 2022-03-24 | Stop reason: HOSPADM

## 2022-03-24 RX ORDER — HYDROMORPHONE HYDROCHLORIDE 1 MG/ML
0.2 INJECTION, SOLUTION INTRAMUSCULAR; INTRAVENOUS; SUBCUTANEOUS EVERY 5 MIN PRN
Status: DISCONTINUED | OUTPATIENT
Start: 2022-03-24 | End: 2022-03-24 | Stop reason: HOSPADM

## 2022-03-24 RX ORDER — MIDAZOLAM HYDROCHLORIDE 1 MG/ML
INJECTION INTRAMUSCULAR; INTRAVENOUS
Status: COMPLETED | OUTPATIENT
Start: 2022-03-24 | End: 2022-03-24

## 2022-03-24 RX ORDER — ONDANSETRON 2 MG/ML
INJECTION INTRAMUSCULAR; INTRAVENOUS AS NEEDED
Status: DISCONTINUED | OUTPATIENT
Start: 2022-03-24 | End: 2022-03-24 | Stop reason: SURG

## 2022-03-24 RX ORDER — GLYCOPYRROLATE 0.2 MG/ML
INJECTION, SOLUTION INTRAMUSCULAR; INTRAVENOUS AS NEEDED
Status: DISCONTINUED | OUTPATIENT
Start: 2022-03-24 | End: 2022-03-24 | Stop reason: SURG

## 2022-03-24 RX ORDER — SENNOSIDES 8.6 MG
17.2 TABLET ORAL NIGHTLY PRN
Status: DISCONTINUED | OUTPATIENT
Start: 2022-03-24 | End: 2022-03-25

## 2022-03-24 RX ORDER — ASPIRIN 81 MG/1
81 TABLET ORAL 2 TIMES DAILY
Status: DISCONTINUED | OUTPATIENT
Start: 2022-03-24 | End: 2022-03-25

## 2022-03-24 RX ORDER — NAPROXEN 500 MG/1
500 TABLET ORAL 2 TIMES DAILY WITH MEALS
Status: DISCONTINUED | OUTPATIENT
Start: 2022-03-25 | End: 2022-03-25

## 2022-03-24 RX ORDER — CELECOXIB 200 MG/1
400 CAPSULE ORAL ONCE
Status: COMPLETED | OUTPATIENT
Start: 2022-03-24 | End: 2022-03-24

## 2022-03-24 RX ORDER — HALOPERIDOL 5 MG/ML
0.25 INJECTION INTRAMUSCULAR ONCE AS NEEDED
Status: DISCONTINUED | OUTPATIENT
Start: 2022-03-24 | End: 2022-03-24 | Stop reason: HOSPADM

## 2022-03-24 RX ORDER — HYDROCODONE BITARTRATE AND ACETAMINOPHEN 5; 325 MG/1; MG/1
1 TABLET ORAL EVERY 4 HOURS PRN
Status: DISCONTINUED | OUTPATIENT
Start: 2022-03-24 | End: 2022-03-25

## 2022-03-24 RX ORDER — ACETAMINOPHEN 500 MG
1000 TABLET ORAL ONCE
Status: COMPLETED | OUTPATIENT
Start: 2022-03-24 | End: 2022-03-24

## 2022-03-24 RX ORDER — SODIUM CHLORIDE, SODIUM LACTATE, POTASSIUM CHLORIDE, CALCIUM CHLORIDE 600; 310; 30; 20 MG/100ML; MG/100ML; MG/100ML; MG/100ML
INJECTION, SOLUTION INTRAVENOUS CONTINUOUS
Status: DISCONTINUED | OUTPATIENT
Start: 2022-03-24 | End: 2022-03-24 | Stop reason: HOSPADM

## 2022-03-24 RX ORDER — ATORVASTATIN CALCIUM 40 MG/1
80 TABLET, FILM COATED ORAL NIGHTLY
Status: DISCONTINUED | OUTPATIENT
Start: 2022-03-24 | End: 2022-03-25

## 2022-03-24 RX ORDER — HYDROMORPHONE HYDROCHLORIDE 1 MG/ML
0.6 INJECTION, SOLUTION INTRAMUSCULAR; INTRAVENOUS; SUBCUTANEOUS EVERY 5 MIN PRN
Status: DISCONTINUED | OUTPATIENT
Start: 2022-03-24 | End: 2022-03-24 | Stop reason: HOSPADM

## 2022-03-24 RX ORDER — HYDROCODONE BITARTRATE AND ACETAMINOPHEN 5; 325 MG/1; MG/1
1 TABLET ORAL AS NEEDED
Status: DISCONTINUED | OUTPATIENT
Start: 2022-03-24 | End: 2022-03-24 | Stop reason: HOSPADM

## 2022-03-24 RX ORDER — PROCHLORPERAZINE EDISYLATE 5 MG/ML
5 INJECTION INTRAMUSCULAR; INTRAVENOUS ONCE AS NEEDED
Status: DISCONTINUED | OUTPATIENT
Start: 2022-03-24 | End: 2022-03-24 | Stop reason: HOSPADM

## 2022-03-24 RX ORDER — MORPHINE SULFATE 4 MG/ML
4 INJECTION, SOLUTION INTRAMUSCULAR; INTRAVENOUS EVERY 10 MIN PRN
Status: DISCONTINUED | OUTPATIENT
Start: 2022-03-24 | End: 2022-03-24 | Stop reason: HOSPADM

## 2022-03-24 RX ORDER — LIDOCAINE HYDROCHLORIDE 10 MG/ML
INJECTION, SOLUTION EPIDURAL; INFILTRATION; INTRACAUDAL; PERINEURAL AS NEEDED
Status: DISCONTINUED | OUTPATIENT
Start: 2022-03-24 | End: 2022-03-24 | Stop reason: SURG

## 2022-03-24 RX ORDER — PROCHLORPERAZINE EDISYLATE 5 MG/ML
10 INJECTION INTRAMUSCULAR; INTRAVENOUS EVERY 6 HOURS PRN
Status: DISCONTINUED | OUTPATIENT
Start: 2022-03-24 | End: 2022-03-25

## 2022-03-24 RX ORDER — CLOPIDOGREL BISULFATE 75 MG/1
75 TABLET ORAL DAILY
Status: DISCONTINUED | OUTPATIENT
Start: 2022-03-25 | End: 2022-03-25

## 2022-03-24 RX ORDER — BISACODYL 10 MG
10 SUPPOSITORY, RECTAL RECTAL
Status: DISCONTINUED | OUTPATIENT
Start: 2022-03-24 | End: 2022-03-25

## 2022-03-24 RX ORDER — MORPHINE SULFATE 4 MG/ML
2 INJECTION, SOLUTION INTRAMUSCULAR; INTRAVENOUS EVERY 10 MIN PRN
Status: DISCONTINUED | OUTPATIENT
Start: 2022-03-24 | End: 2022-03-24 | Stop reason: HOSPADM

## 2022-03-24 RX ORDER — LORAZEPAM 1 MG/1
1 TABLET ORAL NIGHTLY PRN
Status: DISCONTINUED | OUTPATIENT
Start: 2022-03-24 | End: 2022-03-25

## 2022-03-24 RX ORDER — ESCITALOPRAM OXALATE 5 MG/1
10 TABLET ORAL DAILY
Status: DISCONTINUED | OUTPATIENT
Start: 2022-03-25 | End: 2022-03-25

## 2022-03-24 RX ORDER — ROPIVACAINE HYDROCHLORIDE 5 MG/ML
INJECTION, SOLUTION EPIDURAL; INFILTRATION; PERINEURAL
Status: COMPLETED | OUTPATIENT
Start: 2022-03-24 | End: 2022-03-24

## 2022-03-24 RX ORDER — LOSARTAN POTASSIUM 25 MG/1
25 TABLET ORAL DAILY
Status: DISCONTINUED | OUTPATIENT
Start: 2022-03-25 | End: 2022-03-25

## 2022-03-24 RX ORDER — CEFAZOLIN SODIUM/WATER 2 G/20 ML
SYRINGE (ML) INTRAVENOUS AS NEEDED
Status: DISCONTINUED | OUTPATIENT
Start: 2022-03-24 | End: 2022-03-24 | Stop reason: SURG

## 2022-03-24 RX ORDER — SODIUM CHLORIDE, SODIUM LACTATE, POTASSIUM CHLORIDE, CALCIUM CHLORIDE 600; 310; 30; 20 MG/100ML; MG/100ML; MG/100ML; MG/100ML
INJECTION, SOLUTION INTRAVENOUS CONTINUOUS
Status: DISCONTINUED | OUTPATIENT
Start: 2022-03-24 | End: 2022-03-25

## 2022-03-24 RX ORDER — DIPHENHYDRAMINE HYDROCHLORIDE 50 MG/ML
25 INJECTION INTRAMUSCULAR; INTRAVENOUS ONCE AS NEEDED
Status: ACTIVE | OUTPATIENT
Start: 2022-03-24 | End: 2022-03-24

## 2022-03-24 RX ORDER — POLYETHYLENE GLYCOL 3350 17 G/17G
17 POWDER, FOR SOLUTION ORAL DAILY PRN
Status: DISCONTINUED | OUTPATIENT
Start: 2022-03-24 | End: 2022-03-25

## 2022-03-24 RX ORDER — HYDROCODONE BITARTRATE AND ACETAMINOPHEN 5; 325 MG/1; MG/1
2 TABLET ORAL AS NEEDED
Status: DISCONTINUED | OUTPATIENT
Start: 2022-03-24 | End: 2022-03-24 | Stop reason: HOSPADM

## 2022-03-24 RX ORDER — ONDANSETRON 2 MG/ML
4 INJECTION INTRAMUSCULAR; INTRAVENOUS ONCE AS NEEDED
Status: DISCONTINUED | OUTPATIENT
Start: 2022-03-24 | End: 2022-03-24 | Stop reason: HOSPADM

## 2022-03-24 RX ORDER — CEFAZOLIN SODIUM/WATER 2 G/20 ML
2 SYRINGE (ML) INTRAVENOUS EVERY 8 HOURS
Status: COMPLETED | OUTPATIENT
Start: 2022-03-24 | End: 2022-03-25

## 2022-03-24 RX ORDER — ONDANSETRON 2 MG/ML
4 INJECTION INTRAMUSCULAR; INTRAVENOUS EVERY 4 HOURS PRN
Status: DISCONTINUED | OUTPATIENT
Start: 2022-03-24 | End: 2022-03-25

## 2022-03-24 RX ORDER — SODIUM CHLORIDE 0.9 % (FLUSH) 0.9 %
10 SYRINGE (ML) INJECTION AS NEEDED
Status: DISCONTINUED | OUTPATIENT
Start: 2022-03-24 | End: 2022-03-25

## 2022-03-24 RX ADMIN — TRANEXAMIC ACID 1000 MG: 10 INJECTION, SOLUTION INTRAVENOUS at 11:05:00

## 2022-03-24 RX ADMIN — ROPIVACAINE HYDROCHLORIDE 30 ML: 5 INJECTION, SOLUTION EPIDURAL; INFILTRATION; PERINEURAL at 10:27:00

## 2022-03-24 RX ADMIN — TRANEXAMIC ACID 1000 MG: 10 INJECTION, SOLUTION INTRAVENOUS at 12:53:00

## 2022-03-24 RX ADMIN — NEOSTIGMINE METHYLSULFATE 3 MG: 1 INJECTION INTRAVENOUS at 13:10:00

## 2022-03-24 RX ADMIN — EPHEDRINE SULFATE 10 MG: 50 INJECTION, SOLUTION INTRAVENOUS at 12:33:00

## 2022-03-24 RX ADMIN — ROCURONIUM BROMIDE 10 MG: 10 INJECTION, SOLUTION INTRAVENOUS at 12:01:00

## 2022-03-24 RX ADMIN — MIDAZOLAM HYDROCHLORIDE 2 MG: 1 INJECTION INTRAMUSCULAR; INTRAVENOUS at 10:27:00

## 2022-03-24 RX ADMIN — SODIUM CHLORIDE, SODIUM LACTATE, POTASSIUM CHLORIDE, CALCIUM CHLORIDE: 600; 310; 30; 20 INJECTION, SOLUTION INTRAVENOUS at 13:30:00

## 2022-03-24 RX ADMIN — GLYCOPYRROLATE 0.2 MG: 0.2 INJECTION, SOLUTION INTRAMUSCULAR; INTRAVENOUS at 11:53:00

## 2022-03-24 RX ADMIN — ROCURONIUM BROMIDE 50 MG: 10 INJECTION, SOLUTION INTRAVENOUS at 10:56:00

## 2022-03-24 RX ADMIN — EPHEDRINE SULFATE 10 MG: 50 INJECTION, SOLUTION INTRAVENOUS at 11:10:00

## 2022-03-24 RX ADMIN — EPHEDRINE SULFATE 5 MG: 50 INJECTION, SOLUTION INTRAVENOUS at 11:29:00

## 2022-03-24 RX ADMIN — SODIUM CHLORIDE, SODIUM LACTATE, POTASSIUM CHLORIDE, CALCIUM CHLORIDE: 600; 310; 30; 20 INJECTION, SOLUTION INTRAVENOUS at 12:23:00

## 2022-03-24 RX ADMIN — CEFAZOLIN SODIUM/WATER 2 G: 2 G/20 ML SYRINGE (ML) INTRAVENOUS at 10:59:00

## 2022-03-24 RX ADMIN — DEXAMETHASONE SODIUM PHOSPHATE 10 MG: 10 INJECTION, SOLUTION INTRAMUSCULAR; INTRAVENOUS at 10:27:00

## 2022-03-24 RX ADMIN — LIDOCAINE HYDROCHLORIDE 30 MG: 10 INJECTION, SOLUTION EPIDURAL; INFILTRATION; INTRACAUDAL; PERINEURAL at 10:56:00

## 2022-03-24 RX ADMIN — GLYCOPYRROLATE 0.5 MG: 0.2 INJECTION, SOLUTION INTRAMUSCULAR; INTRAVENOUS at 13:10:00

## 2022-03-24 RX ADMIN — SODIUM CHLORIDE, SODIUM LACTATE, POTASSIUM CHLORIDE, CALCIUM CHLORIDE: 600; 310; 30; 20 INJECTION, SOLUTION INTRAVENOUS at 13:18:00

## 2022-03-24 RX ADMIN — ONDANSETRON 4 MG: 2 INJECTION INTRAMUSCULAR; INTRAVENOUS at 12:55:00

## 2022-03-24 NOTE — ANESTHESIA PROCEDURE NOTES
Airway  Date/Time: 3/24/2022 10:57 AM  Urgency: Elective    Airway not difficult    General Information and Staff    Patient location during procedure: OR  Anesthesiologist: Frances Molina MD  Resident/CRNA: Saira Kramer CRNA  Performed: CRNA     Indications and Patient Condition  Indications for airway management: anesthesia  Spontaneous Ventilation: absent  Sedation level: deep  Preoxygenated: yes  Patient position: sniffing  Mask difficulty assessment: 1 - vent by mask    Final Airway Details  Final airway type: endotracheal airway      Successful airway: ETT  Cuffed: yes   Successful intubation technique: direct laryngoscopy  Endotracheal tube insertion site: oral  Blade: Kelley  Blade size: #3  ETT size (mm): 7.5    Cormack-Lehane Classification: grade I - full view of glottis  Placement verified by: chest auscultation and capnometry   Cuff volume (mL): 7  Measured from: teeth  ETT to teeth (cm): 20  Number of attempts at approach: 1

## 2022-03-24 NOTE — OPERATIVE REPORT
[facility] OPERATING ROOM  Operative Note     [facility] Location: OR   [facility] [id number] MRN [id number]   Admission Date 3/24/2022 Operation Date 3/24/2022   Attending Physician Heidi Krishna MD Operating Physician Mine Emmanuel MD      Preoperative Diagnosis: Right rotator cuff tear arthropathy [M75.101, M12.811]     Postoperative Diagnosis: Right rotator cuff tear arthropathy [M75.101, M12.811]     Procedure Performed:   RIGHT REVERSE TOTAL SHOULDER ARTHROPLASTY      Primary Surgeon: Mine Emmanuel MD      Assistant: Kezia Cotton, [facility]    Surgical assistant was required in this case for appropriate retraction, positioning of the limb and, when necessary, implant placement. Surgical Findings: Right shoulder superior humeral head migration with complete tear of the supra and infraspinatus with retraction past the level of the glenoid. Moderate glenohumeral osteoarthritic changes. At the end of the case the shoulder was stable through a full arc of motion including reaching the arm to the top of the head external rotation to 70 degrees at 90 degrees of abduction external rotation by the side to 60 degrees and internal rotation to the stomach. Anesthesia: General     Complications: None     Implants:   Implant Name Type Inv.  Item Serial No.  Lot No. LRB No. Used Action   TORNIER PERFORM RESERSED AUGMENTED GLENOID LATERALIZED BASEPLATE   [id number] Tournier [id number] Right 1 Implanted   SPHR MUKUL STAND 39MM SHLDR REV - [id number]  SPHR MUKUL STAND 39MM SHLDR REV [id number] Right 1 Implanted   STEM PERFORM HUMERAL SZ3+ - [id number]  STEM PERFORM HUMERAL SZ3+ [id number]  [employer] NONE Right 1 Implanted   INSERT PERFORM 3/4 0X39 - [id number]  INSERT PERFORM 3/4 0X39 [id number]  [employer] NONE Right 1 Implanted   SCREW BP 6.5MM 35MM NS MUKUL - [id number]  SCREW BP 6.5MM 35MM NS MUKUL   [employer] NONE Right 1 Implanted   SCREW BP 5MM 22MM NS MUKUL MARA - [id number]  SCREW BP 5MM 22MM NS MUKUL MARA  EH [employer] NONE Right 1 Implanted   SCREW BP 5MM 26MM NS MUKUL MARA - [id number]  SCREW BP 5MM 26MM NS MUKUL MARA  EH [employer] NONE Right 1 Implanted        Specimen: Humeral head sent for routine pathology per protocol     Drains: none     Condition: Stable     Estimated Blood Loss: Blood Output: 200 mL (3/24/2022  1:09 PM)      IVF: 1 L     Tourniquet time:  Not applicable     Case in Detail: Patient was evaluated preoperatively and identified the right shoulder as correct procedure site and it was signed as such. The patient underwent an interscalene block and then was brought back to operating. The patient underwent general anesthesia with endotracheal tube and then positioned in the beachchair position with padding all bony prominences and good head position. Right shoulder was prepped and draped in the usual fashion. A timeout was performed, the patient was identifies as the correct patient, right shoulder was identifies the correct procedure site and this was verified with the consent. The entire operative team agreed to proceed. The patient was given IV antibiotics prior to making skin incisions as well as 1 g of TXA. Next, a longitudinal incision was made over the deltopectoral interval sharply with a #10 blade. Careful dissection was carried down to the fascial layer and the cephalic vein was identified, released, and protected throughout the case. Interval was bluntly dissected down to the anterior aspect of the shoulder. There was severe osteoarthritis of the humeral joint. There was significant tears of the rotator cuff, therefore the decision was made to proceed with reverse total shoulder arthroplasty. Remaining cuff tissue and capsule were released sharply from the humerus and a subperiosteal dissection around the humeral head to protect the underlying axillary nerve. The subscapularis was tagged for later repair. The head was exposed and a cutting guide was used to complete humeral head cut with 20 degrees of retroversion based on preoperative templating. A protector was placed over the cut edges and the glenoid was exposed again doing subperiosteal dissection of the labrum around the edges of the glenoid and placing retractors gently to expose the glenoid in its entirety. A guide was placed in the center of the glenoid and guidepin was passed and bicortical depth with is noted to be appropriate  preoperative templating. A circular glenoid reamer was placed over the guidepin removing appropriate bone to the subchondral layer at the inferior aspect of the glenoid based on preoperative templating. The surrounding bone spurs were removed sharply with a rongeur. The central peg was then drilled over the guidepin. The guidepin was removed and the central screw hole was drilled and measured for an appropriate screw which was consistent with preoperative templating. The glenoid baseplate was then placed with direct pressure into the central hole and then the screw was passed in a bicortical fashion to compress the plate down to bone. Next appropriate reamer was used to ensure there is no surrounding bone or soft tissue that would impinge on the glenosphere. Appropriate size glenosphere was placed based on preoperative templating with gentle mallet taps until the [facility] ALEYDA taper was set and then the central set screw was passed until it is fully seated. Next the proximal humerus was again exposed and a guidepin was placed into the center of the cut surface of the humerus with the guide. The cup hole was then reamed over the guidewire and based on preoperative templating an appropriate sized trial stem was seated with mallet taps.   The shoulder was trialed with appropriate thickness polyethylene trial and was noted to be stable through a full arc of motion including internal rotation to the chest and external rotation in the abducted position to the top of the head. It was stable to shuck test with appropriate tension on the strap muscles. Next the trial was removed the wound was copiously irrigated with normal saline with pulse lavage. The final stem was placed with gentle mallet taps until it is fully seated. The final +3 polyethylene insert was placed and the shoulder was again reduced and was stable through a full arc of motion. The wound was again copiously irrigated and Betadine was placed into the wound for 3 minutes. The remaining subscapularis and capsular tissue was repaired onto the lesser tuberosity with #2 FiberWire stitches. The fascial layer was closed with 0 Vicryl and the skin was closed with 2-0 Vicryl and 2-0 running barbed stitch as well as Dermabond. A sterile dressing was applied and shoulder sling. Disposition: to PACU     Postop Plan: Patient will be discharged when discharge criteria is met. No weightbearing left upper extremity but patient can do active and active assistive range of motion exercises as tolerated and can remove the sling when comfortable for normal daily activities. Follow-up in clinic in 2 weeks for wound check and to start physical therapy.      Con Sultana MD  3/24/2022  1:10 PM

## 2022-03-24 NOTE — ANESTHESIA PROCEDURE NOTES
Peripheral Block    Date/Time: 3/24/2022 10:27 AM  Performed by: Myrna Alonzo MD  Authorized by: Myrna Alonzo MD       General Information and Staff    Start Time:  3/24/2022 10:26 AM  End Time:  3/24/2022 10:29 AM  Anesthesiologist:  Myrna Alonzo MD  Performed by: Anesthesiologist  Patient Location:  PACU    Block Placement: Pre Induction  Site Identification: real time ultrasound guided, nerve stimulator, surface landmarks and image stored and retrievable    Block site/laterality marked before start: site marked  Reason for Block: at surgeon's request and post-op pain management    Preanesthetic Checklist: 2 patient identifers, IV checked, site marked, risks and benefits discussed, monitors and equipment checked, pre-op evaluation, timeout performed, anesthesia consent, sterile technique used, no prohibitive neurological deficits and no local skin infection at insertion site      Procedure Details    Patient Position:  Sitting  Prep: ChloraPrep and patient draped    Monitoring:  Heart rate, continuous pulse ox and blood pressure cuff  Block Type: Interscalene  Laterality:  Right  Injection Technique:  Single-shot    Needle    Needle Type:  Short-bevel  Needle Gauge:  20 G  Needle Length:  25 mm  Needle Localization:  Anatomical landmarks, nerve stimulator and ultrasound guidance  Reason for Ultrasound Use: appropriate spread of the medication was noted in real time and no ultrasound evidence of intravascular and/or intraneural injection    Nerve Stimulator: 0.4 amps  Muscle Twitch Response: digit or wrist extension      Assessment    Injection Assessment:  Good spread noted, incremental injection, local visualized surrounding nerve on ultrasound, low pressure, negative aspiration for heme, negative resistance and no pain on injection  Paresthesia Pain:  None  Heart Rate Change: No    - Patient tolerated block procedure well without evidence of immediate block related complications. Medications  3/24/2022 10:27 AM  Midazolam (VERSED)injection 2mg/2ml, 2 mg  ropivacaine (NAROPIN) injection 0.5%, 30 mL  dexamethasone (DECADRON) PF injection 10 mg/ml, 10 mg    Additional Comments

## 2022-03-24 NOTE — ANESTHESIA POSTPROCEDURE EVALUATION
Patient: Jocy Hidalgo    Procedure Summary     Date: 03/24/22 Room / Location: [address] [place] [address] MAIN OR 11 / 300 [facility] MAIN OR    Anesthesia Start: 5942 Anesthesia Stop: 6713    Procedure: RIGHT REVERSE TOTAL SHOULDER ARTHROPLASTY (Right Shoulder) Diagnosis:       Right rotator cuff tear arthropathy      (Right rotator cuff tear arthropathy [M75.101, M12.811])    Surgeons: Amber Barros MD Anesthesiologist: Kervin Ramos MD    Anesthesia Type: general, regional ASA Status: 2          Anesthesia Type: general, regional    Vitals Value Taken Time   /71 03/24/22 1331   Temp 97.9 03/24/22 1331   Pulse 65 03/24/22 1331   Resp 18 03/24/22 1331   SpO2 94 03/24/22 1331       [facility] AN Post Evaluation:   Patient Evaluated in PACU  Patient Participation: complete - patient participated  Level of Consciousness: awake  Pain Management: adequate  Airway Patency:patent  Dental exam unchanged from preop  Yes    Cardiovascular Status: acceptable  Respiratory Status: acceptable  Postoperative Hydration acceptable      LYLY Croft CRNA  3/24/2022 1:31 PM    Addendum:  Mr. Jocy Hidalgo seen in PACU. Awake and alert, vital signs normal. Pre-op interscalene block working well, denies shoulder pain.  No current concerns.   Meka Mederos M.D.

## 2022-03-25 VITALS
HEART RATE: 55 BPM | DIASTOLIC BLOOD PRESSURE: 66 MMHG | OXYGEN SATURATION: 95 % | TEMPERATURE: 98 F | HEIGHT: 68 IN | WEIGHT: 151 LBS | SYSTOLIC BLOOD PRESSURE: 110 MMHG | RESPIRATION RATE: 16 BRPM | BODY MASS INDEX: 22.88 KG/M2

## 2022-03-25 PROBLEM — M12.811 RIGHT ROTATOR CUFF TEAR ARTHROPATHY: Status: RESOLVED | Noted: 2022-03-24 | Resolved: 2022-03-25

## 2022-03-25 PROBLEM — M75.101 RIGHT ROTATOR CUFF TEAR ARTHROPATHY: Status: RESOLVED | Noted: 2022-03-24 | Resolved: 2022-03-25

## 2022-03-25 PROCEDURE — 99214 OFFICE O/P EST MOD 30 MIN: CPT | Performed by: HOSPITALIST

## 2022-03-25 RX ORDER — ASPIRIN 81 MG/1
81 TABLET ORAL 2 TIMES DAILY
Qty: 60 TABLET | Refills: 0 | Status: SHIPPED | OUTPATIENT
Start: 2022-03-25

## 2022-03-25 RX ORDER — HYDROCODONE BITARTRATE AND ACETAMINOPHEN 5; 325 MG/1; MG/1
1 TABLET ORAL EVERY 4 HOURS PRN
Qty: 42 TABLET | Refills: 0 | Status: SHIPPED | OUTPATIENT
Start: 2022-03-25

## 2022-03-25 RX ORDER — NAPROXEN 500 MG/1
500 TABLET ORAL 2 TIMES DAILY WITH MEALS
Qty: 8 TABLET | Refills: 0 | Status: SHIPPED | OUTPATIENT
Start: 2022-03-25

## 2022-03-25 NOTE — CM/SW NOTE
03/25/22 1000   Discharge disposition   Expected discharge disposition Home or Self   Home services after discharge None   Discharge transportation Private car     Pt discussed during nursing rounds. Pt is stable for dc today. MD dc order entered. No home care needs identified on dc. Plan: Home today   / to remain available for support and/or discharge planning. Jeremi Soto.  Ranjan Silvestre RN, BSN  Nurse   [phone]

## 2022-03-28 RX ORDER — ESCITALOPRAM OXALATE 10 MG/1
10 TABLET ORAL DAILY
Qty: 90 TABLET | Refills: 1 | Status: SHIPPED | OUTPATIENT
Start: 2022-03-28

## 2022-03-28 NOTE — TELEPHONE ENCOUNTER
Refill passed per [facility] protocol. See contraindication with Naproxen. Requested Prescriptions   Pending Prescriptions Disp Refills    escitalopram 10 MG Oral Tab 90 tablet 1     Sig: Take 1 tablet (10 mg total) by mouth daily.         Psychiatric Non-Scheduled (Anti-Anxiety) Passed - 3/28/2022 11:08 AM        Passed - Appointment in last 6 or next 3 months               Recent Outpatient Visits              1 week ago Preoperative evaluation to rule out surgical contraindication    Cardiology - Moreno Herring MD    Office Visit    1 week ago Preop examination    [facility], [address] [facility] [place]    Office Visit    2 months ago Right rotator cuff tear arthropathy    [facility] for Alessandra Segura MD    Office Visit    2 months ago Septal myocardial infarction [facility])    Cardiology - Twyla Mcgovern MD    Office Visit    3 months ago Disorder of right rotator cuff    [facility] for Alessandra Segura MD    Office Visit            Future Appointments         Provider Department Appt Notes    In 1 week Flavia Nunez MD [facility], [address] 1 POV Right reverse total shoulder, Sx 3/24/22

## 2022-04-01 RX ORDER — LORAZEPAM 1 MG/1
1 TABLET ORAL NIGHTLY PRN
Qty: 30 TABLET | Refills: 1 | Status: SHIPPED | OUTPATIENT
Start: 2022-04-01

## 2022-04-01 NOTE — TELEPHONE ENCOUNTER
Please review. No protocol. Requested Prescriptions   Pending Prescriptions Disp Refills    LORazepam 1 MG Oral Tab 30 tablet 1     Sig: Take 1 tablet (1 mg total) by mouth nightly as needed.         There is no refill protocol information for this order         Future Appointments         Provider Department Appt Notes    In 3 weeks Guillermina Montenegro MD [facility], [address], [place] Follow up sx on 03/24          Recent Outpatient Visits              1 week ago Preoperative evaluation to rule out surgical contraindication    Cardiology - Danay Barney MD    Office Visit    2 weeks ago Preop examination    [facility] James Rueda MD    Office Visit    2 months ago Right rotator cuff tear arthropathy    [facility] for Monae Iverson MD    Office Visit    2 months ago Septal myocardial infarction [facility])    Cardiology - Gerson Galaviz MD    Office Visit    3 months ago Disorder of right rotator cuff    [facility] for Monae Iverson [facility]    Office Visit

## 2022-04-11 ENCOUNTER — PATIENT MESSAGE (OUTPATIENT)
Dept: FAMILY MEDICINE CLINIC | Facility: CLINIC | Age: 62
End: 2022-04-11

## 2022-04-12 NOTE — TELEPHONE ENCOUNTER
Future Appointments   Date Time Provider Lex Bustillo   4/26/2022  3:45 PM Jolanta Warner MD [facility]   5/17/2022  3:15 PM Narda Juares MD [address] [place] [address]

## 2022-04-12 NOTE — TELEPHONE ENCOUNTER
I did this certification  but they  after 90 days. I have to see him in person to do it again according to the requirements. Please schedule for appointment within the next 3 weeks and okay to use acute.

## 2022-04-14 NOTE — TELEPHONE ENCOUNTER
1st attempt/left voice mail message for pt to call back. When the patient returns the call he has an appt scheduled with Dr. Mariely Otero on 5-17-22. Per the doctors message below she would like to see him within 3 weeks. Please, schedule the appointment for within 3 weeks. Ok to use sick visit slot also ok 'd per the doctors message below.

## 2022-04-26 ENCOUNTER — HOSPITAL ENCOUNTER (OUTPATIENT)
Dept: GENERAL RADIOLOGY | Facility: HOSPITAL | Age: 62
Discharge: HOME | End: 2022-04-26
Attending: ORTHOPAEDIC SURGERY
Payer: COMMERCIAL

## 2022-04-26 ENCOUNTER — OFFICE VISIT (OUTPATIENT)
Dept: ORTHOPEDICS CLINIC | Facility: CLINIC | Age: 62
End: 2022-04-26
Payer: COMMERCIAL

## 2022-04-26 DIAGNOSIS — Z96.611 S/P REVERSE TOTAL SHOULDER ARTHROPLASTY, RIGHT: ICD-10-CM

## 2022-04-26 DIAGNOSIS — Z96.611 S/P REVERSE TOTAL SHOULDER ARTHROPLASTY, RIGHT: Primary | ICD-10-CM

## 2022-04-26 PROCEDURE — 99024 POSTOP FOLLOW-UP VISIT: CPT | Performed by: ORTHOPAEDIC SURGERY

## 2022-04-26 PROCEDURE — 73030 X-RAY EXAM OF SHOULDER: CPT | Performed by: ORTHOPAEDIC SURGERY

## 2022-04-26 RX ORDER — EZETIMIBE 10 MG/1
TABLET ORAL
COMMUNITY
Start: 2021-11-11

## 2022-06-06 ENCOUNTER — TELEPHONE (OUTPATIENT)
Dept: PHYSICAL THERAPY | Facility: HOSPITAL | Age: 62
End: 2022-06-06

## 2022-06-06 ENCOUNTER — OFFICE VISIT (OUTPATIENT)
Dept: PHYSICAL THERAPY | Facility: HOSPITAL | Age: 62
End: 2022-06-06
Attending: ORTHOPAEDIC SURGERY
Payer: COMMERCIAL

## 2022-06-06 DIAGNOSIS — Z96.611 S/P REVERSE TOTAL SHOULDER ARTHROPLASTY, RIGHT: ICD-10-CM

## 2022-06-06 PROCEDURE — 97161 PT EVAL LOW COMPLEX 20 MIN: CPT

## 2022-06-06 PROCEDURE — 97110 THERAPEUTIC EXERCISES: CPT

## 2022-06-08 ENCOUNTER — OFFICE VISIT (OUTPATIENT)
Dept: PHYSICAL THERAPY | Facility: HOSPITAL | Age: 62
End: 2022-06-08
Attending: ORTHOPAEDIC SURGERY
Payer: COMMERCIAL

## 2022-06-08 PROCEDURE — 97140 MANUAL THERAPY 1/> REGIONS: CPT

## 2022-06-08 PROCEDURE — 97110 THERAPEUTIC EXERCISES: CPT

## 2022-06-10 ENCOUNTER — APPOINTMENT (OUTPATIENT)
Dept: PHYSICAL THERAPY | Facility: HOSPITAL | Age: 62
End: 2022-06-10
Attending: ORTHOPAEDIC SURGERY
Payer: COMMERCIAL

## 2022-06-15 ENCOUNTER — OFFICE VISIT (OUTPATIENT)
Dept: PHYSICAL THERAPY | Facility: HOSPITAL | Age: 62
End: 2022-06-15
Attending: ORTHOPAEDIC SURGERY
Payer: COMMERCIAL

## 2022-06-15 PROCEDURE — 97110 THERAPEUTIC EXERCISES: CPT

## 2022-06-16 ENCOUNTER — TELEPHONE (OUTPATIENT)
Dept: FAMILY MEDICINE CLINIC | Facility: CLINIC | Age: 62
End: 2022-06-16

## 2022-06-16 ENCOUNTER — TELEPHONE (OUTPATIENT)
Dept: PHYSICAL THERAPY | Facility: HOSPITAL | Age: 62
End: 2022-06-16

## 2022-06-17 ENCOUNTER — APPOINTMENT (OUTPATIENT)
Dept: PHYSICAL THERAPY | Facility: HOSPITAL | Age: 62
End: 2022-06-17
Attending: ORTHOPAEDIC SURGERY
Payer: COMMERCIAL

## 2022-06-20 ENCOUNTER — OFFICE VISIT (OUTPATIENT)
Dept: PHYSICAL THERAPY | Facility: HOSPITAL | Age: 62
End: 2022-06-20
Attending: ORTHOPAEDIC SURGERY
Payer: COMMERCIAL

## 2022-06-20 PROCEDURE — 97110 THERAPEUTIC EXERCISES: CPT

## 2022-06-22 ENCOUNTER — OFFICE VISIT (OUTPATIENT)
Dept: PHYSICAL THERAPY | Facility: HOSPITAL | Age: 62
End: 2022-06-22
Attending: ORTHOPAEDIC SURGERY
Payer: COMMERCIAL

## 2022-06-22 PROCEDURE — 97140 MANUAL THERAPY 1/> REGIONS: CPT

## 2022-06-22 PROCEDURE — 97110 THERAPEUTIC EXERCISES: CPT

## 2022-06-27 ENCOUNTER — APPOINTMENT (OUTPATIENT)
Dept: PHYSICAL THERAPY | Facility: HOSPITAL | Age: 62
End: 2022-06-27
Attending: ORTHOPAEDIC SURGERY
Payer: COMMERCIAL

## 2022-06-27 ENCOUNTER — TELEPHONE (OUTPATIENT)
Dept: PHYSICAL THERAPY | Facility: HOSPITAL | Age: 62
End: 2022-06-27

## 2022-06-29 ENCOUNTER — OFFICE VISIT (OUTPATIENT)
Dept: PHYSICAL THERAPY | Facility: HOSPITAL | Age: 62
End: 2022-06-29
Attending: ORTHOPAEDIC SURGERY
Payer: COMMERCIAL

## 2022-06-29 PROCEDURE — 97140 MANUAL THERAPY 1/> REGIONS: CPT

## 2022-06-29 PROCEDURE — 97110 THERAPEUTIC EXERCISES: CPT

## 2022-07-01 ENCOUNTER — OFFICE VISIT (OUTPATIENT)
Dept: PHYSICAL THERAPY | Facility: HOSPITAL | Age: 62
End: 2022-07-01
Attending: ORTHOPAEDIC SURGERY
Payer: COMMERCIAL

## 2022-07-01 PROCEDURE — 97140 MANUAL THERAPY 1/> REGIONS: CPT

## 2022-07-01 PROCEDURE — 97110 THERAPEUTIC EXERCISES: CPT

## 2022-07-05 ENCOUNTER — OFFICE VISIT (OUTPATIENT)
Dept: PHYSICAL THERAPY | Facility: HOSPITAL | Age: 62
End: 2022-07-05
Attending: ORTHOPAEDIC SURGERY
Payer: COMMERCIAL

## 2022-07-05 DIAGNOSIS — Z96.611 S/P SHOULDER REPLACEMENT, RIGHT: Primary | ICD-10-CM

## 2022-07-05 PROCEDURE — 97140 MANUAL THERAPY 1/> REGIONS: CPT

## 2022-07-05 PROCEDURE — 97110 THERAPEUTIC EXERCISES: CPT

## 2022-07-08 ENCOUNTER — OFFICE VISIT (OUTPATIENT)
Dept: PHYSICAL THERAPY | Facility: HOSPITAL | Age: 62
End: 2022-07-08
Attending: ORTHOPAEDIC SURGERY
Payer: COMMERCIAL

## 2022-07-08 PROCEDURE — 97140 MANUAL THERAPY 1/> REGIONS: CPT

## 2022-07-08 PROCEDURE — 97110 THERAPEUTIC EXERCISES: CPT

## 2022-07-12 ENCOUNTER — OFFICE VISIT (OUTPATIENT)
Dept: PHYSICAL THERAPY | Facility: HOSPITAL | Age: 62
End: 2022-07-12
Attending: ORTHOPAEDIC SURGERY
Payer: COMMERCIAL

## 2022-07-12 PROCEDURE — 97110 THERAPEUTIC EXERCISES: CPT

## 2022-07-12 PROCEDURE — 97140 MANUAL THERAPY 1/> REGIONS: CPT

## 2022-07-14 ENCOUNTER — OFFICE VISIT (OUTPATIENT)
Dept: PHYSICAL THERAPY | Facility: HOSPITAL | Age: 62
End: 2022-07-14
Attending: ORTHOPAEDIC SURGERY
Payer: COMMERCIAL

## 2022-07-14 PROCEDURE — 97110 THERAPEUTIC EXERCISES: CPT

## 2022-07-21 ENCOUNTER — OFFICE VISIT (OUTPATIENT)
Dept: PHYSICAL THERAPY | Facility: HOSPITAL | Age: 62
End: 2022-07-21
Attending: ORTHOPAEDIC SURGERY
Payer: COMMERCIAL

## 2022-07-21 PROCEDURE — 97110 THERAPEUTIC EXERCISES: CPT

## 2022-07-28 ENCOUNTER — OFFICE VISIT (OUTPATIENT)
Dept: PHYSICAL THERAPY | Facility: HOSPITAL | Age: 62
End: 2022-07-28
Attending: ORTHOPAEDIC SURGERY
Payer: COMMERCIAL

## 2022-07-28 PROCEDURE — 97110 THERAPEUTIC EXERCISES: CPT

## 2022-08-16 ENCOUNTER — OFFICE VISIT (OUTPATIENT)
Dept: PHYSICAL THERAPY | Facility: HOSPITAL | Age: 62
End: 2022-08-16
Attending: ORTHOPAEDIC SURGERY
Payer: COMMERCIAL

## 2022-08-16 PROCEDURE — 97110 THERAPEUTIC EXERCISES: CPT

## 2022-08-19 RX ORDER — LORAZEPAM 1 MG/1
TABLET ORAL
Qty: 30 TABLET | Refills: 0 | Status: SHIPPED | OUTPATIENT
Start: 2022-08-19

## 2022-08-29 ENCOUNTER — TELEPHONE (OUTPATIENT)
Dept: PHYSICAL THERAPY | Facility: HOSPITAL | Age: 62
End: 2022-08-29

## 2022-09-19 ENCOUNTER — OFFICE VISIT (OUTPATIENT)
Dept: PHYSICAL THERAPY | Facility: HOSPITAL | Age: 62
End: 2022-09-19
Attending: ORTHOPAEDIC SURGERY
Payer: COMMERCIAL

## 2022-09-19 ENCOUNTER — TELEPHONE (OUTPATIENT)
Dept: FAMILY MEDICINE CLINIC | Facility: CLINIC | Age: 62
End: 2022-09-19

## 2022-09-19 PROCEDURE — 97110 THERAPEUTIC EXERCISES: CPT

## 2022-09-19 RX ORDER — ESCITALOPRAM OXALATE 10 MG/1
10 TABLET ORAL DAILY
Qty: 90 TABLET | Refills: 1 | Status: SHIPPED | OUTPATIENT
Start: 2022-09-19 | End: 2022-10-18

## 2022-09-19 NOTE — TELEPHONE ENCOUNTER
Refill passed per [place] [employer] [place], [facility] protocol.   Requested Prescriptions   Pending Prescriptions Disp Refills    ESCITALOPRAM 10 MG Oral Tab [Pharmacy Med Name: ESCITALOPRAM 10MG TABLETS] 90 tablet 1     Sig: TAKE 1 TABLET(10 MG) BY MOUTH DAILY        Psychiatric Non-Scheduled (Anti-Anxiety) Passed - 9/17/2022  1:46 PM        Passed - In person appointment or virtual visit in the past 6 mos or appointment in next 3 mos       Recent Outpatient Visits              1 month ago     [facility] [address] [place] Visit    1 month ago     [facility] [address] [place] Visit    2 months ago     [facility] [address] [place] Visit    2 months ago     [facility] [address] [place] Visit    2 months ago     [facility], [address] [facility]    Office Visit     Future Appointments         Provider Department Appt Notes    Today Daphnie Betty, [address] [place] PPO  no c/p, no auth, no limit    In 1 week Daphnie Betty, [address] [place] PPO  no c/p, no auth, no limit                   Recent Outpatient Visits              1 month ago     [facility] Daphnie Betty, [address]    Office Visit    1 month ago     [address] [facility] [address], [address] [place] Visit    2 months ago     [facility] [address] [place] Visit    2 months ago     [address] [facility], [address] [place] Visit    2 months ago     [address] [facility], [address] [place] Visit          Future Appointments         Provider Department Appt Notes    Today Daphnie Betty, [address] [place] PPO  no c/p, no auth, no limit    In 1 week Daphnie Betty, [address] [place] PPO  no c/p, no auth, no limit

## 2022-09-20 ENCOUNTER — TELEPHONE (OUTPATIENT)
Dept: FAMILY MEDICINE CLINIC | Facility: CLINIC | Age: 62
End: 2022-09-20

## 2022-09-20 RX ORDER — DEXTROAMPHETAMINE SACCHARATE, AMPHETAMINE ASPARTATE, DEXTROAMPHETAMINE SULFATE AND AMPHETAMINE SULFATE 2.5; 2.5; 2.5; 2.5 MG/1; MG/1; MG/1; MG/1
10 TABLET ORAL DAILY
Qty: 30 TABLET | Refills: 0 | Status: SHIPPED | OUTPATIENT
Start: 2022-10-20 | End: 2022-11-19

## 2022-09-20 RX ORDER — DEXTROAMPHETAMINE SACCHARATE, AMPHETAMINE ASPARTATE, DEXTROAMPHETAMINE SULFATE AND AMPHETAMINE SULFATE 2.5; 2.5; 2.5; 2.5 MG/1; MG/1; MG/1; MG/1
10 TABLET ORAL DAILY
Qty: 30 TABLET | Refills: 0 | Status: SHIPPED | OUTPATIENT
Start: 2022-11-20 | End: 2022-12-20

## 2022-09-20 RX ORDER — DEXTROAMPHETAMINE SACCHARATE, AMPHETAMINE ASPARTATE, DEXTROAMPHETAMINE SULFATE AND AMPHETAMINE SULFATE 2.5; 2.5; 2.5; 2.5 MG/1; MG/1; MG/1; MG/1
10 TABLET ORAL DAILY
Qty: 30 TABLET | Refills: 0 | Status: SHIPPED | OUTPATIENT
Start: 2022-09-20 | End: 2022-10-20

## 2022-09-20 NOTE — TELEPHONE ENCOUNTER
Please let patient know refill sent to pharmacy but 6-month ADD medication follow-up appointment will be needed prior to next refill request.

## 2022-09-20 NOTE — TELEPHONE ENCOUNTER
Pt was prescribed Adderall 10MG for 30 tablets. Pt said he did not use it everyday. Pt is out of medication and wanted to know if Dr would prescribe medication again?  Please advise

## 2022-09-21 NOTE — TELEPHONE ENCOUNTER
Please review refill protocol failed/ no protocol  Requested Prescriptions   Pending Prescriptions Disp Refills    LORazepam 1 MG Oral Tab 30 tablet 1     Sig: Take 1 tablet (1 mg total) by mouth nightly as needed.         There is no refill protocol information for this order

## 2022-09-22 RX ORDER — LORAZEPAM 1 MG/1
1 TABLET ORAL NIGHTLY PRN
Qty: 30 TABLET | Refills: 1 | Status: SHIPPED | OUTPATIENT
Start: 2022-09-22

## 2022-09-26 ENCOUNTER — APPOINTMENT (OUTPATIENT)
Dept: PHYSICAL THERAPY | Facility: HOSPITAL | Age: 62
End: 2022-09-26
Attending: ORTHOPAEDIC SURGERY
Payer: COMMERCIAL

## 2022-11-18 DIAGNOSIS — E78.00 PURE HYPERCHOLESTEROLEMIA: Chronic | ICD-10-CM

## 2022-11-18 DIAGNOSIS — I21.29 SEPTAL MYOCARDIAL INFARCTION (HCC): ICD-10-CM

## 2022-11-19 RX ORDER — LOSARTAN POTASSIUM 25 MG/1
TABLET ORAL
Qty: 90 TABLET | Refills: 3 | Status: SHIPPED | OUTPATIENT
Start: 2022-11-19

## 2022-11-30 RX ORDER — DEXTROAMPHETAMINE SACCHARATE, AMPHETAMINE ASPARTATE, DEXTROAMPHETAMINE SULFATE AND AMPHETAMINE SULFATE 2.5; 2.5; 2.5; 2.5 MG/1; MG/1; MG/1; MG/1
10 TABLET ORAL DAILY
Qty: 30 TABLET | Refills: 0 | OUTPATIENT
Start: 2022-11-30 | End: 2022-12-30

## 2022-12-17 RX ORDER — LORAZEPAM 1 MG/1
1 TABLET ORAL NIGHTLY PRN
Qty: 30 TABLET | Refills: 2 | Status: CANCELLED | OUTPATIENT
Start: 2022-12-17

## 2022-12-19 RX ORDER — SILDENAFIL 50 MG/1
50 TABLET, FILM COATED ORAL
Qty: 12 TABLET | Refills: 3 | Status: SHIPPED | OUTPATIENT
Start: 2022-12-19

## 2023-02-07 LAB — AMB EXT COVID-19 RESULT: DETECTED

## 2023-02-08 ENCOUNTER — NURSE TRIAGE (OUTPATIENT)
Dept: FAMILY MEDICINE CLINIC | Facility: CLINIC | Age: 63
End: 2023-02-08

## 2023-02-09 ENCOUNTER — TELEMEDICINE (OUTPATIENT)
Dept: FAMILY MEDICINE CLINIC | Facility: CLINIC | Age: 63
End: 2023-02-09

## 2023-02-09 DIAGNOSIS — U07.1 COVID: Primary | ICD-10-CM

## 2023-02-09 PROCEDURE — 99212 OFFICE O/P EST SF 10 MIN: CPT | Performed by: FAMILY MEDICINE

## 2023-02-23 ENCOUNTER — TELEPHONE (OUTPATIENT)
Dept: FAMILY MEDICINE CLINIC | Facility: CLINIC | Age: 63
End: 2023-02-23

## 2023-03-08 RX ORDER — LORAZEPAM 1 MG/1
TABLET ORAL
Qty: 30 TABLET | Refills: 0 | Status: SHIPPED | OUTPATIENT
Start: 2023-03-08

## 2023-03-08 NOTE — TELEPHONE ENCOUNTER
Please review. Protocol Failed or has No Protocol.     Requested Prescriptions   Pending Prescriptions Disp Refills    LORAZEPAM 1 MG Oral Tab [Pharmacy Med Name: LORAZEPAM 1MG TABLETS] 30 tablet 0     Sig: TAKE 1 TABLET(1 MG) BY MOUTH EVERY NIGHT AS NEEDED       There is no refill protocol information for this order              Recent Outpatient Visits              3 weeks ago [address], [place]    Telemedicine    4 months ago Attention deficit disorder (ADD) without hyperactivity    [facility], [address], [facility] Bucky Mendiola MD    Office Visit    5 months ago     [address] [place], [place]    Office Visit    6 months ago     [facility], Zeina Oak[place], [place]    Office Visit    7 months ago     [facility] Vicki Menezes [place]    Office Visit

## 2023-03-15 ENCOUNTER — TELEPHONE (OUTPATIENT)
Dept: FAMILY MEDICINE CLINIC | Facility: CLINIC | Age: 63
End: 2023-03-15

## 2023-03-15 NOTE — TELEPHONE ENCOUNTER
Prior authorization initiated for Viagra,await 1-5 days for decision   Form faxed to [phone]    No documentation of submission request or forms from 3/9/23, PA closed and new intiated

## 2023-04-07 NOTE — TELEPHONE ENCOUNTER
· PLEASE ADVISE ON REFILL   Recent Visits       Provider Department Primary Dx    5 days ago Shirley Caraballo MD [facility], [facility], [address], [facility] Routine physical examination    1 year ago Shirley Caraballo MD [facility], [facility], [address], [place] no weight-bearing restrictions

## 2023-04-08 RX ORDER — LORAZEPAM 1 MG/1
TABLET ORAL
Qty: 30 TABLET | Refills: 0 | Status: SHIPPED | OUTPATIENT
Start: 2023-04-08

## 2023-04-08 NOTE — TELEPHONE ENCOUNTER
Dr Brooks Doll (on behalf of Dr Love Parents)      Please review; protocol failed/no protocol. Requested Prescriptions   Pending Prescriptions Disp Refills    LORAZEPAM 1 MG Oral Tab [Pharmacy Med Name: LORAZEPAM 1MG TABLETS] 30 tablet 0     Sig: TAKE 1 TABLET(1 MG) BY MOUTH EVERY NIGHT AS NEEDED       There is no refill protocol information for this order           Recent Outpatient Visits              1 month ago [address] [facility], MD    Telemedicine    5 months ago Attention deficit disorder (ADD) without hyperactivity    [facility], [address], Chan Krishna MD    Office Visit    6 months ago     [address] [place] [address]    Office Visit    7 months ago     [address] [place] [address]    Office Visit    8 months ago     [address] [place] [address]    Office Visit             Future Appointments         Provider Department Appt Notes    In 4 [place] [address] [facility] think i'm due for a phys. I'd also like to get my shoulder checked. it's been popping out of joint.

## 2023-04-11 ENCOUNTER — OFFICE VISIT (OUTPATIENT)
Dept: FAMILY MEDICINE CLINIC | Facility: CLINIC | Age: 63
End: 2023-04-11

## 2023-04-11 VITALS
DIASTOLIC BLOOD PRESSURE: 71 MMHG | WEIGHT: 152.38 LBS | TEMPERATURE: 98 F | BODY MASS INDEX: 23 KG/M2 | SYSTOLIC BLOOD PRESSURE: 121 MMHG | HEART RATE: 52 BPM

## 2023-04-11 DIAGNOSIS — I25.10 CORONARY ARTERY DISEASE INVOLVING NATIVE CORONARY ARTERY OF NATIVE HEART WITHOUT ANGINA PECTORIS: Chronic | ICD-10-CM

## 2023-04-11 DIAGNOSIS — Z00.00 ROUTINE PHYSICAL EXAMINATION: Primary | ICD-10-CM

## 2023-04-11 DIAGNOSIS — E78.00 PURE HYPERCHOLESTEROLEMIA: Chronic | ICD-10-CM

## 2023-04-11 DIAGNOSIS — I10 ESSENTIAL HYPERTENSION: Chronic | ICD-10-CM

## 2023-04-11 DIAGNOSIS — Z12.12 ENCOUNTER FOR COLORECTAL CANCER SCREENING: ICD-10-CM

## 2023-04-11 DIAGNOSIS — Z96.611 HISTORY OF RIGHT SHOULDER REPLACEMENT: ICD-10-CM

## 2023-04-11 DIAGNOSIS — Z12.11 ENCOUNTER FOR COLORECTAL CANCER SCREENING: ICD-10-CM

## 2023-04-11 PROCEDURE — 90715 TDAP VACCINE 7 YRS/> IM: CPT | Performed by: FAMILY MEDICINE

## 2023-04-11 PROCEDURE — 99396 PREV VISIT EST AGE 40-64: CPT | Performed by: FAMILY MEDICINE

## 2023-04-11 PROCEDURE — 90677 PCV20 VACCINE IM: CPT | Performed by: FAMILY MEDICINE

## 2023-04-11 PROCEDURE — 3078F DIAST BP <80 MM HG: CPT | Performed by: FAMILY MEDICINE

## 2023-04-11 PROCEDURE — 3074F SYST BP LT 130 MM HG: CPT | Performed by: FAMILY MEDICINE

## 2023-04-11 PROCEDURE — 90471 IMMUNIZATION ADMIN: CPT | Performed by: FAMILY MEDICINE

## 2023-04-11 PROCEDURE — 90472 IMMUNIZATION ADMIN EACH ADD: CPT | Performed by: FAMILY MEDICINE

## 2023-05-22 ENCOUNTER — PATIENT MESSAGE (OUTPATIENT)
Dept: FAMILY MEDICINE CLINIC | Facility: CLINIC | Age: 63
End: 2023-05-22

## 2023-06-05 ENCOUNTER — HOSPITAL ENCOUNTER (OUTPATIENT)
Age: 63
Discharge: HOME | End: 2023-06-05
Payer: COMMERCIAL

## 2023-06-05 ENCOUNTER — TELEPHONE (OUTPATIENT)
Dept: FAMILY MEDICINE CLINIC | Facility: CLINIC | Age: 63
End: 2023-06-05

## 2023-06-05 VITALS
SYSTOLIC BLOOD PRESSURE: 112 MMHG | RESPIRATION RATE: 20 BRPM | HEART RATE: 59 BPM | DIASTOLIC BLOOD PRESSURE: 71 MMHG | OXYGEN SATURATION: 99 % | TEMPERATURE: 98 F

## 2023-06-05 DIAGNOSIS — J06.9 VIRAL URI WITH COUGH: Primary | ICD-10-CM

## 2023-06-05 LAB
S PYO AG THROAT QL: NEGATIVE
SARS-COV-2 RNA RESP QL NAA+PROBE: NOT DETECTED

## 2023-06-05 PROCEDURE — U0002 COVID-19 LAB TEST NON-CDC: HCPCS | Performed by: NURSE PRACTITIONER

## 2023-06-05 PROCEDURE — 99203 OFFICE O/P NEW LOW 30 MIN: CPT | Performed by: NURSE PRACTITIONER

## 2023-06-05 PROCEDURE — 87880 STREP A ASSAY W/OPTIC: CPT | Performed by: NURSE PRACTITIONER

## 2023-06-05 NOTE — TELEPHONE ENCOUNTER
Patient is requesting to schedule an appt via [facility] for the following      I've got a terrible cough, a sharp pain in the center of my chest. headaches, and a sore throat.

## 2023-06-05 NOTE — ED INITIAL ASSESSMENT (HPI)
Pt came in due to cough, congestion, headache, sore throat, and fatigue for the past 4 days. Pt has easy non labored respirations.

## 2023-06-05 NOTE — TELEPHONE ENCOUNTER
Called patient, confirmed name and . Fatigue and headaches starting on Friday and now has a painful cough. Brutal sore throat. Feels like he has a lot of mucous, but cough is not productive. Feeling slightly better today. Last temp of 100F last night. Taking Nyquil and ibuprofen. Negative home covid test Saturday morning. No office appointments available today. Patient advised to go to . Patient states he will go when he is done with work.

## 2023-06-13 NOTE — PROGRESS NOTES
Patient:   Wili Segura. #:   [id number]                    Room: [facility]  Dict. MD #:  [id number]     ADMITTED:   03/21/2013        DATE OF PROCEDURE: March 21, 2013     PROCEDURE:  Colonoscopy. PREOPERATIVE DIAGNOSIS:  Screening. POSTOPERATIVE DIAGNOSIS:  Hemorrhoids. SCOPE USED:  Adult adjustable. PREPARATION:  Good. MEDICATION GIVEN:  Fentanyl 75 mcg IV push, Versed 6.0 mg IV push   in  divided doses. SURGEON:  Roseanne Howard MD     REFERRING PHYSICIAN:  Dr. Cherrie Farr NOTE:  The patient was consented and placed in the left   lateral  decubitus position. Incremental doses of sedatives were given   until  adequate sedation was achieved. Rectal exam reveals normal tone,   no  masses. The colonoscope was then inserted and advanced to the   cecum at  approximately 75 cm. The scope was then withdrawn with inspection   of  mucosal surfaces. Retroflexion was performed in the rectum. The   patient  tolerated the procedure well. There were no immediate   complications. FINDINGS:  Small anal canal hemorrhoids, otherwise normal exam.     IMPRESSION:  See above. RECOMMENDATIONS  1. Repeat exam in 10 years. 2. Further recommendations pending above and course. D:    03/21/2013 8:53 A  T:    03/21/2013 10:07 A  ATTENDING:  Roseanne Howard MD  DICTATING:    Roseanne Howard MD MD ID #:      [id number]  cc: Lev Leos [place] [id number]        Roseanne Howard MD        JOB NUMBER:         [id number]  [facility] #: [id number]. [facility]  MR #: [id number]                  Patient: Evelin Terrell

## 2023-06-14 ENCOUNTER — NURSE ONLY (OUTPATIENT)
Facility: CLINIC | Age: 63
End: 2023-06-14

## 2023-06-14 NOTE — PROGRESS NOTES
Called patient for a scheduled telephone colon screening. PATIENT MUST COME IN FOR OFFICE VISIT-    Reason #! GI questionnaires incomplete. Reason #2 HX of MI & on blood thinner warrants office visit. CCS- If patient returns my call please schedule him for an office visit with a provider of his choice. Thank you!

## 2023-06-23 ENCOUNTER — OFFICE VISIT (OUTPATIENT)
Dept: FAMILY MEDICINE CLINIC | Facility: CLINIC | Age: 63
End: 2023-06-23

## 2023-06-23 VITALS
HEART RATE: 63 BPM | BODY MASS INDEX: 23.34 KG/M2 | TEMPERATURE: 98 F | WEIGHT: 154 LBS | SYSTOLIC BLOOD PRESSURE: 136 MMHG | DIASTOLIC BLOOD PRESSURE: 80 MMHG | OXYGEN SATURATION: 96 % | HEIGHT: 68 IN

## 2023-06-23 DIAGNOSIS — S62.606D CLOSED NONDISPLACED FRACTURE OF PHALANX OF RIGHT LITTLE FINGER WITH ROUTINE HEALING, UNSPECIFIED PHALANX, SUBSEQUENT ENCOUNTER: Primary | ICD-10-CM

## 2023-06-23 PROCEDURE — 3079F DIAST BP 80-89 MM HG: CPT | Performed by: FAMILY MEDICINE

## 2023-06-23 PROCEDURE — 99213 OFFICE O/P EST LOW 20 MIN: CPT | Performed by: FAMILY MEDICINE

## 2023-06-23 PROCEDURE — 3008F BODY MASS INDEX DOCD: CPT | Performed by: FAMILY MEDICINE

## 2023-06-23 PROCEDURE — 3075F SYST BP GE 130 - 139MM HG: CPT | Performed by: FAMILY MEDICINE

## 2023-07-27 ENCOUNTER — IMAGING SERVICES (OUTPATIENT)
Dept: GENERAL RADIOLOGY | Age: 63
End: 2023-07-27
Attending: ORTHOPAEDIC SURGERY

## 2023-07-27 ENCOUNTER — APPOINTMENT (OUTPATIENT)
Dept: ORTHOPEDICS | Age: 63
End: 2023-07-27

## 2023-07-27 ENCOUNTER — OFFICE VISIT (OUTPATIENT)
Dept: ORTHOPEDICS | Age: 63
End: 2023-07-27

## 2023-07-27 DIAGNOSIS — M25.311 INSTABILITY OF RIGHT SHOULDER JOINT: Primary | ICD-10-CM

## 2023-07-27 DIAGNOSIS — Z96.611 S/P REVERSE TOTAL SHOULDER ARTHROPLASTY, RIGHT: ICD-10-CM

## 2023-07-27 PROCEDURE — 73030 X-RAY EXAM OF SHOULDER: CPT | Performed by: ORTHOPAEDIC SURGERY

## 2023-07-27 PROCEDURE — 99213 OFFICE O/P EST LOW 20 MIN: CPT | Performed by: ORTHOPAEDIC SURGERY

## 2023-07-27 NOTE — TELEPHONE ENCOUNTER
PA dept please assist. Pt scheduled on 1/29 Physical Therapy Visit    Visit Type: Daily Treatment Note  Visit: 23  Referring Provider: Ryley Calzada MD  Medical Diagnosis (from order): Diagnosis Information    Diagnosis  V43.65 (ICD-9-CM) - Z96.651 (ICD-10-CM) - Status post revision of total knee, right         SUBJECTIVE                                                                                                               Patient reports that she has good days and bad days.Patient did not offer a pain rating other than stating that her knee feels tight today.        OBJECTIVE                                                                                                                                       Treatment     Therapeutic Exercise  1) Seated SCIFIT recumbant bike-3 min / seat at 7 / level 1   2) Sit to stand from chair seat with blue cushion-15x-hands on wall ladder  3) 4\" step ups over to 6\" steps-4x back and forth  4) Seated Sports Art Bike-1/2 circles forward/retro-3 min.   5) Standing gastroc [id number]  6) Standing hamstring [id number]    Manual Therapy   Performed anterior incision STM followed by AAROM with contract/release techniques to decrease scar tissue restrictions and to improve knee mobility.Patient was seated for treatment.     Seated AAROM=0-95 Deg.  Seated AROM= 0-90 Deg.        ASSESSMENT                                                                                                            Patient tolerated todays TE's fairly but she continues to exhibit apprehension when attempting to flex her knee when doing chair squats,seated biking,forward step lunge and when sitting on the edge of the high/low table flexion her knee.She does appear to be ambulating with an improved heel to toe gait sequence and she also is able to complete the steps in the gym with improved techniques and decreased hesitation when alternating her feet with each step.Following treatment the patient stated that she would do ice at home  later on.      PLAN                                                                                                                           Suggestions for next session as indicated: Patient will be following up with the PT on her next treatment session.        Therapy procedure time and total treatment time can be found documented on the Time Entry flowsheet

## 2023-07-29 ENCOUNTER — APPOINTMENT (OUTPATIENT)
Dept: CT IMAGING | Age: 63
End: 2023-07-29
Attending: ORTHOPAEDIC SURGERY

## 2023-08-15 ENCOUNTER — PATIENT MESSAGE (OUTPATIENT)
Dept: FAMILY MEDICINE CLINIC | Facility: CLINIC | Age: 63
End: 2023-08-15

## 2023-08-15 DIAGNOSIS — I21.29 SEPTAL MYOCARDIAL INFARCTION (HCC): ICD-10-CM

## 2023-08-15 DIAGNOSIS — E78.00 PURE HYPERCHOLESTEROLEMIA: Chronic | ICD-10-CM

## 2023-08-15 NOTE — TELEPHONE ENCOUNTER
From: Sharyle Scrape  To: Taryn Linares MD  Sent: 8/15/2023 11:57 AM CDT  Subject: medications    hi, dr. Laney Snowden    for some reason, my request to renew my atorvastatin was denied. i haven't had it for a month, and i can't recall having any conversation about discontinuing it. i would also like to get a new prescription for adderal..    i also think i'm in need of getting some bloodwork done; that may be the reason for the denial of the atorvastatin. would love to figure out this stuff.     i hope you're well.    cielo

## 2023-08-16 ENCOUNTER — APPOINTMENT (OUTPATIENT)
Dept: CT IMAGING | Age: 63
End: 2023-08-16
Attending: ORTHOPAEDIC SURGERY

## 2023-08-16 RX ORDER — DEXTROAMPHETAMINE SACCHARATE, AMPHETAMINE ASPARTATE, DEXTROAMPHETAMINE SULFATE AND AMPHETAMINE SULFATE 2.5; 2.5; 2.5; 2.5 MG/1; MG/1; MG/1; MG/1
10 TABLET ORAL DAILY
Qty: 30 TABLET | Refills: 0 | Status: SHIPPED | OUTPATIENT
Start: 2023-08-16 | End: 2023-09-15

## 2023-08-16 RX ORDER — ATORVASTATIN CALCIUM 80 MG/1
80 TABLET, FILM COATED ORAL NIGHTLY
Qty: 90 TABLET | Refills: 3 | Status: SHIPPED | OUTPATIENT
Start: 2023-08-16

## 2023-08-16 NOTE — TELEPHONE ENCOUNTER
Dr. Tariq Del Angel, atorvastatin pended. You saw patient 4/11/23 and patient has labs, including lipid panel, that he did not complete. I'll ask patient to make appointment to restart Adderall.

## 2023-08-16 NOTE — TELEPHONE ENCOUNTER
Thanks. Since I have seen him in the past 6 months, and I know he uses Adderall intermittently, I did send 1 refill. Definitely should get the labs ordered at previous visit and make 6-month ADD and other medication follow-up visit for 10/2023.

## 2023-09-05 ENCOUNTER — HOSPITAL ENCOUNTER (OUTPATIENT)
Dept: CT IMAGING | Age: 63
Discharge: HOME | End: 2023-09-05
Attending: ORTHOPAEDIC SURGERY

## 2023-09-05 DIAGNOSIS — M25.311 INSTABILITY OF RIGHT SHOULDER JOINT: ICD-10-CM

## 2023-09-05 DIAGNOSIS — Z96.611 S/P REVERSE TOTAL SHOULDER ARTHROPLASTY, RIGHT: ICD-10-CM

## 2023-09-05 PROCEDURE — 73200 CT UPPER EXTREMITY W/O DYE: CPT

## 2023-09-05 PROCEDURE — G1004 CDSM NDSC: HCPCS

## 2023-09-14 ENCOUNTER — TELEPHONE (OUTPATIENT)
Dept: ORTHOPEDICS | Age: 63
End: 2023-09-14

## 2023-09-26 RX ORDER — LORAZEPAM 1 MG/1
1 TABLET ORAL NIGHTLY PRN
Qty: 30 TABLET | Refills: 1 | Status: SHIPPED | OUTPATIENT
Start: 2023-09-26

## 2023-09-26 NOTE — TELEPHONE ENCOUNTER
No protocol for requested medication.   Please advise on refill request.      Requested Prescriptions     Pending Prescriptions Disp Refills    LORAZEPAM 1 MG Oral Tab [Pharmacy Med Name: LORAZEPAM 1MG TABLETS] 30 tablet 0     Sig: TAKE 1 TABLET(1 MG) BY MOUTH EVERY NIGHT AS NEEDED      Recent Visits  Date Type Provider Dept   06/23/23 Office Visit Nany Luciano MD [facility]   04/11/23 Office Visit Calla Rubinstein, MD [facility]   10/18/22 Office Visit Calla Rubinstein, MD [facility]   04/15/22 Office Visit Calla Rubinstein, MD [facility]   Showing recent visits within past 540 days with a meds authorizing provider and meeting all other requirements  Future Appointments  Date Type Provider Dept   10/18/23 Appointment Calla Rubinstein, MD [facility]   Showing future appointments within next 150 days with a meds authorizing provider and meeting all other requirements     Requested Prescriptions   Pending Prescriptions Disp Refills    LORAZEPAM 1 MG Oral Tab [Pharmacy Med Name: LORAZEPAM 1MG TABLETS] 30 tablet 0     Sig: TAKE 1 TABLET(1 MG) BY MOUTH EVERY NIGHT AS NEEDED       There is no refill protocol information for this order         Future Appointments         Provider Department Appt Notes    In 3 weeks Calla Rubinstein, MD [address] [place] [address] medication follow-up           Recent Outpatient Visits              3 months ago Closed nondisplaced fracture of phalanx of right little finger with routine healing, unspecified phalanx, subsequent encounter    [address], [address], Atul Rust MD    Office Visit    3 months ago     [address], [address], [place]    Nurse Only    5 months ago Routine physical examination    [address], [address], Heather Simmons MD    Office Visit    7 months ago Ana Sevilla, Arturo Hill MD Telemedicine    11 months ago Attention deficit disorder (ADD) without hyperactivity    [address], [address], Nish Ramirez MD    Office Visit

## 2023-10-17 ENCOUNTER — LAB ENCOUNTER (OUTPATIENT)
Dept: LAB | Age: 63
End: 2023-10-17
Payer: COMMERCIAL

## 2023-10-17 DIAGNOSIS — I10 ESSENTIAL HYPERTENSION: Chronic | ICD-10-CM

## 2023-10-17 DIAGNOSIS — Z12.5 SCREENING FOR PROSTATE CANCER: ICD-10-CM

## 2023-10-17 LAB
ALBUMIN SERPL-MCNC: 4.4 G/DL (ref 3.4–5)
ALBUMIN/GLOB SERPL: 1.6 {RATIO} (ref 1–2)
ALP LIVER SERPL-CCNC: 84 U/L
ALT SERPL-CCNC: 32 U/L
ANION GAP SERPL CALC-SCNC: 5 MMOL/L (ref 0–18)
AST SERPL-CCNC: 29 U/L (ref 15–37)
BILIRUB SERPL-MCNC: 0.5 MG/DL (ref 0.1–2)
BUN BLD-MCNC: 18 MG/DL (ref 7–18)
BUN/CREAT SERPL: 16.5 (ref 10–20)
CALCIUM BLD-MCNC: 9 MG/DL (ref 8.5–10.1)
CHLORIDE SERPL-SCNC: 109 MMOL/L (ref 98–112)
CHOLEST SERPL-MCNC: 167 MG/DL (ref ?–200)
CO2 SERPL-SCNC: 29 MMOL/L (ref 21–32)
COMPLEXED PSA SERPL-MCNC: 2.34 NG/ML (ref ?–4)
CREAT BLD-MCNC: 1.09 MG/DL
DEPRECATED RDW RBC AUTO: 39.9 FL (ref 35.1–46.3)
EGFRCR SERPLBLD CKD-EPI 2021: 77 ML/MIN/1.73M2 (ref 60–?)
ERYTHROCYTE [DISTWIDTH] IN BLOOD BY AUTOMATED COUNT: 12.1 % (ref 11–15)
FASTING PATIENT LIPID ANSWER: NO
FASTING STATUS PATIENT QL REPORTED: NO
GLOBULIN PLAS-MCNC: 2.7 G/DL (ref 2.8–4.4)
GLUCOSE BLD-MCNC: 98 MG/DL (ref 70–99)
HCT VFR BLD AUTO: 43.4 %
HDLC SERPL-MCNC: 48 MG/DL (ref 40–59)
HGB BLD-MCNC: 14.7 G/DL
LDLC SERPL CALC-MCNC: 82 MG/DL (ref ?–100)
MCH RBC QN AUTO: 30.4 PG (ref 26–34)
MCHC RBC AUTO-ENTMCNC: 33.9 G/DL (ref 31–37)
MCV RBC AUTO: 89.9 FL
NONHDLC SERPL-MCNC: 119 MG/DL (ref ?–130)
OSMOLALITY SERPL CALC.SUM OF ELEC: 298 MOSM/KG (ref 275–295)
PLATELET # BLD AUTO: 269 10(3)UL (ref 150–450)
POTASSIUM SERPL-SCNC: 4.7 MMOL/L (ref 3.5–5.1)
PROT SERPL-MCNC: 7.1 G/DL (ref 6.4–8.2)
RBC # BLD AUTO: 4.83 X10(6)UL
SODIUM SERPL-SCNC: 143 MMOL/L (ref 136–145)
TRIGL SERPL-MCNC: 224 MG/DL (ref 30–149)
VLDLC SERPL CALC-MCNC: 36 MG/DL (ref 0–30)
WBC # BLD AUTO: 7.3 X10(3) UL (ref 4–11)

## 2023-10-17 PROCEDURE — 80061 LIPID PANEL: CPT

## 2023-10-17 PROCEDURE — 36415 COLL VENOUS BLD VENIPUNCTURE: CPT

## 2023-10-17 PROCEDURE — 80053 COMPREHEN METABOLIC PANEL: CPT

## 2023-10-17 PROCEDURE — 85027 COMPLETE CBC AUTOMATED: CPT

## 2023-10-18 DIAGNOSIS — E78.00 PURE HYPERCHOLESTEROLEMIA: Primary | ICD-10-CM

## 2023-10-18 RX ORDER — EZETIMIBE 10 MG/1
10 TABLET ORAL NIGHTLY
Qty: 90 TABLET | Refills: 0 | Status: SHIPPED | OUTPATIENT
Start: 2023-10-18

## 2023-10-26 RX ORDER — LORAZEPAM 1 MG/1
1 TABLET ORAL NIGHTLY PRN
Qty: 90 TABLET | Refills: 0 | Status: SHIPPED | OUTPATIENT
Start: 2023-10-26

## 2023-10-26 NOTE — TELEPHONE ENCOUNTER
Please review; protocol failed.    Requested Prescriptions   Pending Prescriptions Disp Refills    LORAZEPAM 1 MG Oral Tab [Pharmacy Med Name: LORAZEPAM 1MG TABLETS] 90 tablet 0     Sig: TAKE 1 TABLET(1 MG) BY MOUTH EVERY NIGHT AS NEEDED       There is no refill protocol information for this order        Recent Outpatient Visits              1 week ago Attention deficit hyperactivity disorder (ADHD), unspecified ADHD type    [facility], [address] [place] [address], [address] PRITESH Mercado    Office Visit    4 months ago Closed nondisplaced fracture of phalanx of right little finger with routine healing, unspecified phalanx, subsequent encounter    [address], Yordan Vivar MD    Office Visit    4 months ago     [address], [address], [place]    Nurse Only    6 months ago Routine physical examination    [address], [facility], José Miguel Limon MD    Office Visit    8 months ago [facility], [facility] Windy Bhardwaj MD    Telemedicine

## 2023-12-21 ENCOUNTER — TELEPHONE (OUTPATIENT)
Dept: FAMILY MEDICINE CLINIC | Facility: CLINIC | Age: 63
End: 2023-12-21

## 2023-12-21 NOTE — TELEPHONE ENCOUNTER
Pt was discharged from [facility] on 12/20 for Afib. Pt wanted to discuss with  his situation. Pt declined HFU. I did give [facility] cardio # since he did say he needed to see one asap. Please advise

## 2023-12-21 NOTE — TELEPHONE ENCOUNTER
Also see patient message 23.    Called patient, confirmed name and .    Patient advised that Dr. Eason is out of town until the middle of next week.  Patient advised that next best step is to see Cardiology.  Patient states he has an appointment tomorrow.  Patient states he does not need his messages addressed by another provider but to please forward messages to Dr. Eason so she is aware.

## 2023-12-22 ENCOUNTER — LAB ENCOUNTER (OUTPATIENT)
Dept: LAB | Facility: HOSPITAL | Age: 63
End: 2023-12-22
Attending: INTERNAL MEDICINE
Payer: COMMERCIAL

## 2023-12-22 ENCOUNTER — HOSPITAL ENCOUNTER (OUTPATIENT)
Dept: GENERAL RADIOLOGY | Facility: HOSPITAL | Age: 63
Discharge: HOME | End: 2023-12-22
Attending: INTERNAL MEDICINE
Payer: COMMERCIAL

## 2023-12-22 DIAGNOSIS — Z01.818 PREOP EXAMINATION: ICD-10-CM

## 2023-12-22 DIAGNOSIS — E78.00 PURE HYPERCHOLESTEROLEMIA: ICD-10-CM

## 2023-12-22 LAB
ALBUMIN SERPL-MCNC: 4.8 G/DL (ref 3.2–4.8)
ALBUMIN/GLOB SERPL: 2.2 {RATIO} (ref 1–2)
ALP LIVER SERPL-CCNC: 81 U/L
ALT SERPL-CCNC: 55 U/L
ANION GAP SERPL CALC-SCNC: 3 MMOL/L (ref 0–18)
AST SERPL-CCNC: 54 U/L (ref ?–34)
BILIRUB SERPL-MCNC: 0.7 MG/DL (ref 0.2–1.1)
BUN BLD-MCNC: 17 MG/DL (ref 9–23)
BUN/CREAT SERPL: 17.3 (ref 10–20)
CALCIUM BLD-MCNC: 9.6 MG/DL (ref 8.7–10.4)
CHLORIDE SERPL-SCNC: 104 MMOL/L (ref 98–112)
CHOLEST SERPL-MCNC: 131 MG/DL (ref ?–200)
CO2 SERPL-SCNC: 32 MMOL/L (ref 21–32)
CREAT BLD-MCNC: 0.98 MG/DL
EGFRCR SERPLBLD CKD-EPI 2021: 88 ML/MIN/1.73M2 (ref 60–?)
FASTING PATIENT LIPID ANSWER: YES
FASTING STATUS PATIENT QL REPORTED: YES
GLOBULIN PLAS-MCNC: 2.2 G/DL (ref 2.8–4.4)
GLUCOSE BLD-MCNC: 91 MG/DL (ref 70–99)
HDLC SERPL-MCNC: 48 MG/DL (ref 40–59)
LDLC SERPL CALC-MCNC: 62 MG/DL (ref ?–100)
NONHDLC SERPL-MCNC: 83 MG/DL (ref ?–130)
OSMOLALITY SERPL CALC.SUM OF ELEC: 289 MOSM/KG (ref 275–295)
POTASSIUM SERPL-SCNC: 4.2 MMOL/L (ref 3.5–5.1)
PROT SERPL-MCNC: 7 G/DL (ref 5.7–8.2)
SODIUM SERPL-SCNC: 139 MMOL/L (ref 136–145)
TRIGL SERPL-MCNC: 116 MG/DL (ref 30–149)
VLDLC SERPL CALC-MCNC: 17 MG/DL (ref 0–30)

## 2023-12-22 PROCEDURE — 80053 COMPREHEN METABOLIC PANEL: CPT

## 2023-12-22 PROCEDURE — 36415 COLL VENOUS BLD VENIPUNCTURE: CPT

## 2023-12-22 PROCEDURE — 71046 X-RAY EXAM CHEST 2 VIEWS: CPT | Performed by: INTERNAL MEDICINE

## 2023-12-22 PROCEDURE — 80061 LIPID PANEL: CPT

## 2023-12-27 ENCOUNTER — HOSPITAL ENCOUNTER (OUTPATIENT)
Dept: INTERVENTIONAL RADIOLOGY/VASCULAR | Facility: HOSPITAL | Age: 63
Discharge: HOME | End: 2023-12-27
Attending: INTERNAL MEDICINE | Admitting: INTERNAL MEDICINE
Payer: COMMERCIAL

## 2023-12-27 VITALS
TEMPERATURE: 97 F | HEART RATE: 48 BPM | BODY MASS INDEX: 22.43 KG/M2 | OXYGEN SATURATION: 97 % | SYSTOLIC BLOOD PRESSURE: 132 MMHG | HEIGHT: 68 IN | RESPIRATION RATE: 18 BRPM | DIASTOLIC BLOOD PRESSURE: 90 MMHG | WEIGHT: 148 LBS

## 2023-12-27 DIAGNOSIS — R93.89 ABNORMAL MRI: ICD-10-CM

## 2023-12-27 DIAGNOSIS — I25.10 CAD (CORONARY ARTERY DISEASE), NATIVE CORONARY ARTERY: ICD-10-CM

## 2023-12-27 DIAGNOSIS — I51.9 LEFT VENTRICULAR SYSTOLIC DYSFUNCTION (LVSD): ICD-10-CM

## 2023-12-27 LAB — GLUCOSE BLDC GLUCOMTR-MCNC: 101 MG/DL (ref 70–99)

## 2023-12-27 PROCEDURE — 99153 MOD SED SAME PHYS/QHP EA: CPT | Performed by: INTERNAL MEDICINE

## 2023-12-27 PROCEDURE — 36415 COLL VENOUS BLD VENIPUNCTURE: CPT

## 2023-12-27 PROCEDURE — B2151ZZ FLUOROSCOPY OF LEFT HEART USING LOW OSMOLAR CONTRAST: ICD-10-PCS | Performed by: INTERNAL MEDICINE

## 2023-12-27 PROCEDURE — 82962 GLUCOSE BLOOD TEST: CPT

## 2023-12-27 PROCEDURE — 93458 L HRT ARTERY/VENTRICLE ANGIO: CPT | Performed by: INTERNAL MEDICINE

## 2023-12-27 PROCEDURE — 4A023N7 MEASUREMENT OF CARDIAC SAMPLING AND PRESSURE, LEFT HEART, PERCUTANEOUS APPROACH: ICD-10-PCS | Performed by: INTERNAL MEDICINE

## 2023-12-27 PROCEDURE — B2111ZZ FLUOROSCOPY OF MULTIPLE CORONARY ARTERIES USING LOW OSMOLAR CONTRAST: ICD-10-PCS | Performed by: INTERNAL MEDICINE

## 2023-12-27 PROCEDURE — 99152 MOD SED SAME PHYS/QHP 5/>YRS: CPT | Performed by: INTERNAL MEDICINE

## 2023-12-27 RX ORDER — MIDAZOLAM HYDROCHLORIDE 1 MG/ML
INJECTION INTRAMUSCULAR; INTRAVENOUS
Status: COMPLETED
Start: 2023-12-27 | End: 2023-12-27

## 2023-12-27 RX ORDER — ASPIRIN 81 MG/1
TABLET, CHEWABLE ORAL
Status: COMPLETED
Start: 2023-12-27 | End: 2023-12-27

## 2023-12-27 RX ORDER — LIDOCAINE HYDROCHLORIDE 20 MG/ML
INJECTION, SOLUTION EPIDURAL; INFILTRATION; INTRACAUDAL; PERINEURAL
Status: COMPLETED
Start: 2023-12-27 | End: 2023-12-27

## 2023-12-27 RX ORDER — NITROGLYCERIN 20 MG/100ML
INJECTION INTRAVENOUS
Status: COMPLETED
Start: 2023-12-27 | End: 2023-12-27

## 2023-12-27 RX ORDER — HEPARIN SODIUM 1000 [USP'U]/ML
INJECTION, SOLUTION INTRAVENOUS; SUBCUTANEOUS
Status: DISCONTINUED
Start: 2023-12-27 | End: 2023-12-27 | Stop reason: WASHOUT

## 2023-12-27 RX ORDER — SODIUM CHLORIDE 9 MG/ML
3 INJECTION, SOLUTION INTRAVENOUS
Status: COMPLETED | OUTPATIENT
Start: 2023-12-27 | End: 2023-12-27

## 2023-12-27 RX ORDER — DIPHENHYDRAMINE HYDROCHLORIDE 50 MG/ML
INJECTION INTRAMUSCULAR; INTRAVENOUS
Status: COMPLETED
Start: 2023-12-27 | End: 2023-12-27

## 2023-12-27 RX ORDER — ASPIRIN 81 MG/1
324 TABLET, CHEWABLE ORAL ONCE
Status: COMPLETED | OUTPATIENT
Start: 2023-12-27 | End: 2023-12-27

## 2023-12-27 RX ORDER — VERAPAMIL HYDROCHLORIDE 2.5 MG/ML
INJECTION, SOLUTION INTRAVENOUS
Status: DISCONTINUED
Start: 2023-12-27 | End: 2023-12-27 | Stop reason: WASHOUT

## 2023-12-27 RX ADMIN — SODIUM CHLORIDE 3 ML/KG/HR: 9 INJECTION, SOLUTION INTRAVENOUS at 11:00:00

## 2023-12-27 RX ADMIN — ASPIRIN 324 MG: 81 TABLET, CHEWABLE ORAL at 11:18:00

## 2023-12-27 NOTE — INTERVAL H&P NOTE
Pre-op Diagnosis: * No pre-op diagnosis entered *    The above referenced H&P was reviewed by Nicole Kirby MD on 12/27/2023, the patient was examined and no significant changes have occurred in the patient's condition since the H&P was performed. I discussed with the patient and/or legal representative the potential benefits, risks and side effects of this procedure; the likelihood of the patient achieving goals; and potential problems that might occur during recuperation. I discussed reasonable alternatives to the procedure, including risks, benefits and side effects related to the alternatives and risks related to not receiving this procedure. We will proceed with procedure as planned.

## 2023-12-27 NOTE — PROCEDURES
[facility]  [facility] Cardiac Cath Procedure Note  Cedrick Moreno Patient Status:  Outpatient    10/3/1960 MRN [id number]   Location [facility] Attending Juan. Dora Anthony MD   Hosp Day # 0 PCP Heide Lin. Rajat Aguilar MD       Cardiologist: Nicole Kirby MD  Primary Proceduralist: Nicole Kirby MD  Procedure Performed: [facility]  Date of Procedure: 2023   Indication: Abnormal cardiac MRI. Summary of findings: Nonobstructive CAD. Left Ventriculography and hemodynamics: LVEDP 5 mmHg. Initial attempt was made via right radial approach due to extremely tortuous right radial artery and small diameter vessel switch to groin approach. Coronary Angiography  RCA:  Dominant and free of obstructive disease, supplies PDA and PL  Left main:  Patent, free of obstructive disease  Left anterior descending:  Patent and free of obstructive disease, supplies 2 diagonals which are non-obstructive  Circumflex:  Patent and free of obstructive disease, supplies 2 OM branches which are patent      Assessment:  Nonobstructive CAD. Overall sluggish flow DARLINE II in all coronary vessels. LVEF about 45 to 50% with mild anterior hypokinesis. Recommendations:  Medical management      Description of Procedure:   After written informed consent was obtained from the patient, patient was brought to the cardiac catheterization laboratory. Patient was prepped and draped in the usual sterile fashion. Lidocaine 1% was used to infiltrate the right groin for local anesthesia and a 6 Latvian introducer sheath was inserted into the right femoral artery. Selective coronary angiography performed with JR4 catheter for RCA and JL4 catheter for LCA. Angiography performed in standard projections. 6 Greek pigtail placed in LV for LV angiogram in WAYNE projection and LV hemodynamics. Selective right femoral angiogram done assess anatomy for closure.       Specimen sent to: No specimen collected  Estimated blood loss: 10 cc  Closure: Mynx       IV was maintained by RN and moderate conscious sedation of versed and fentanyl was given. Patient was assessed and monitoring of oxygen, heart rate and blood pressure by nurse and myself during the exam 30 minutes.       Nichole Veronica MD  12/27/23

## 2023-12-27 NOTE — IVS NOTE
DISCHARGE NOTE      Pt is able to sit up and ambulate without difficulty. Pt voided and tolerated fluids and food. Procedural sites (right radial and right femoral) remains dry and intact with good circulation, motion, and sensation. No signs and symptoms of bleeding/hematoma noted. Instruction provided, patient/family verbalizes understanding. Dr. Ibrahima Sterling spoke with patient/family post procedure.       Follow up Appointment: as already scheduled and reviewed with pt at discharge teaching

## 2024-01-04 NOTE — H&P
[facility]    Cardiac Electrophysiology H&P Addendum    William Thomas Lombardo Location:Cath Lab Suites   [facility] [id number] MRN [id number]   Admission Date 1/17/2024 Procedure Date 1/17/2024   Attending Physician Lynn Manzo MD Procedure Physician Lynn Manzo MD       H&P ADDENDUM    I personally reviewed the attached H&P on 1/17/2024, and no significant changes have occurred in the patient's condition since the H&P was performed.  Risks, alternatives, and benefits of procedure were again reviewed at bedside with the patient.  They have no additional questions and wish to proceed.    Lynn Manzo M.D.   Cardiac Electrophysiology       -----------------------------------------

## 2024-01-12 ENCOUNTER — LAB ENCOUNTER (OUTPATIENT)
Dept: LAB | Facility: HOSPITAL | Age: 64
End: 2024-01-12
Attending: INTERNAL MEDICINE
Payer: COMMERCIAL

## 2024-01-12 DIAGNOSIS — Z01.818 PRE-OP TESTING: ICD-10-CM

## 2024-01-12 PROCEDURE — 87641 MR-STAPH DNA AMP PROBE: CPT

## 2024-01-13 LAB — MRSA DNA SPEC QL NAA+PROBE: NEGATIVE

## 2024-01-17 ENCOUNTER — APPOINTMENT (OUTPATIENT)
Dept: GENERAL RADIOLOGY | Facility: HOSPITAL | Age: 64
End: 2024-01-17
Attending: INTERNAL MEDICINE
Payer: COMMERCIAL

## 2024-01-17 ENCOUNTER — HOSPITAL ENCOUNTER (OUTPATIENT)
Dept: INTERVENTIONAL RADIOLOGY/VASCULAR | Facility: HOSPITAL | Age: 64
Discharge: HOME | End: 2024-01-17
Attending: INTERNAL MEDICINE | Admitting: INTERNAL MEDICINE
Payer: COMMERCIAL

## 2024-01-17 VITALS
DIASTOLIC BLOOD PRESSURE: 78 MMHG | OXYGEN SATURATION: 96 % | TEMPERATURE: 98 F | HEART RATE: 60 BPM | RESPIRATION RATE: 19 BRPM | SYSTOLIC BLOOD PRESSURE: 110 MMHG

## 2024-01-17 DIAGNOSIS — I49.5 SICK SINUS SYNDROME (HCC): ICD-10-CM

## 2024-01-17 DIAGNOSIS — I47.29 NSVT (NONSUSTAINED VENTRICULAR TACHYCARDIA) (HCC): ICD-10-CM

## 2024-01-17 DIAGNOSIS — Z01.818 PRE-OP TESTING: Primary | ICD-10-CM

## 2024-01-17 LAB — GLUCOSE BLDC GLUCOMTR-MCNC: 83 MG/DL (ref 70–99)

## 2024-01-17 PROCEDURE — 3E083KZ INTRODUCTION OF OTHER DIAGNOSTIC SUBSTANCE INTO HEART, PERCUTANEOUS APPROACH: ICD-10-PCS | Performed by: INTERNAL MEDICINE

## 2024-01-17 PROCEDURE — 4A0234Z MEASUREMENT OF CARDIAC ELECTRICAL ACTIVITY, PERCUTANEOUS APPROACH: ICD-10-PCS | Performed by: INTERNAL MEDICINE

## 2024-01-17 PROCEDURE — 02HK3KZ INSERTION OF DEFIBRILLATOR LEAD INTO RIGHT VENTRICLE, PERCUTANEOUS APPROACH: ICD-10-PCS | Performed by: INTERNAL MEDICINE

## 2024-01-17 PROCEDURE — 82962 GLUCOSE BLOOD TEST: CPT

## 2024-01-17 PROCEDURE — 36415 COLL VENOUS BLD VENIPUNCTURE: CPT

## 2024-01-17 PROCEDURE — 4B02XTZ MEASUREMENT OF CARDIAC DEFIBRILLATOR, EXTERNAL APPROACH: ICD-10-PCS | Performed by: INTERNAL MEDICINE

## 2024-01-17 PROCEDURE — 0JH608Z INSERTION OF DEFIBRILLATOR GENERATOR INTO CHEST SUBCUTANEOUS TISSUE AND FASCIA, OPEN APPROACH: ICD-10-PCS | Performed by: INTERNAL MEDICINE

## 2024-01-17 PROCEDURE — 33249 INSJ/RPLCMT DEFIB W/LEAD(S): CPT | Performed by: INTERNAL MEDICINE

## 2024-01-17 PROCEDURE — 93620 COMP EP EVL R AT VEN PAC&REC: CPT | Performed by: INTERNAL MEDICINE

## 2024-01-17 PROCEDURE — 93621 COMP EP EVL L PAC&REC C SINS: CPT | Performed by: INTERNAL MEDICINE

## 2024-01-17 PROCEDURE — 71045 X-RAY EXAM CHEST 1 VIEW: CPT | Performed by: INTERNAL MEDICINE

## 2024-01-17 PROCEDURE — 99152 MOD SED SAME PHYS/QHP 5/>YRS: CPT | Performed by: INTERNAL MEDICINE

## 2024-01-17 PROCEDURE — 02K83ZZ MAP CONDUCTION MECHANISM, PERCUTANEOUS APPROACH: ICD-10-PCS | Performed by: INTERNAL MEDICINE

## 2024-01-17 PROCEDURE — 93609 INTRA-VNTR MAPG TCHYCAR SITE: CPT | Performed by: INTERNAL MEDICINE

## 2024-01-17 PROCEDURE — 4A023FZ MEASUREMENT OF CARDIAC RHYTHM, PERCUTANEOUS APPROACH: ICD-10-PCS | Performed by: INTERNAL MEDICINE

## 2024-01-17 PROCEDURE — 99153 MOD SED SAME PHYS/QHP EA: CPT | Performed by: INTERNAL MEDICINE

## 2024-01-17 PROCEDURE — 02H63KZ INSERTION OF DEFIBRILLATOR LEAD INTO RIGHT ATRIUM, PERCUTANEOUS APPROACH: ICD-10-PCS | Performed by: INTERNAL MEDICINE

## 2024-01-17 RX ORDER — LIDOCAINE HYDROCHLORIDE 20 MG/ML
INJECTION, SOLUTION INFILTRATION; PERINEURAL
Status: COMPLETED
Start: 2024-01-17 | End: 2024-01-17

## 2024-01-17 RX ORDER — METOPROLOL SUCCINATE 25 MG/1
12.5 TABLET, EXTENDED RELEASE ORAL DAILY
Qty: 15 TABLET | Refills: 0 | Status: SHIPPED | OUTPATIENT
Start: 2024-01-18 | End: 2024-02-17

## 2024-01-17 RX ORDER — CEFAZOLIN SODIUM/WATER 2 G/20 ML
SYRINGE (ML) INTRAVENOUS
Status: COMPLETED
Start: 2024-01-17 | End: 2024-01-17

## 2024-01-17 RX ORDER — ACETAMINOPHEN AND CODEINE PHOSPHATE 300; 30 MG/1; MG/1
2 TABLET ORAL EVERY 4 HOURS PRN
Status: DISCONTINUED | OUTPATIENT
Start: 2024-01-17 | End: 2024-01-17

## 2024-01-17 RX ORDER — MIDAZOLAM HYDROCHLORIDE 1 MG/ML
INJECTION INTRAMUSCULAR; INTRAVENOUS
Status: COMPLETED
Start: 2024-01-17 | End: 2024-01-17

## 2024-01-17 RX ORDER — LIDOCAINE HYDROCHLORIDE AND EPINEPHRINE 10; 10 MG/ML; UG/ML
INJECTION, SOLUTION INFILTRATION; PERINEURAL
Status: COMPLETED
Start: 2024-01-17 | End: 2024-01-17

## 2024-01-17 RX ORDER — ACETAMINOPHEN AND CODEINE PHOSPHATE 300; 30 MG/1; MG/1
1 TABLET ORAL EVERY 4 HOURS PRN
Status: DISCONTINUED | OUTPATIENT
Start: 2024-01-17 | End: 2024-01-17

## 2024-01-17 RX ORDER — CEPHALEXIN 500 MG/1
500 CAPSULE ORAL 4 TIMES DAILY
Qty: 4 CAPSULE | Refills: 0 | Status: SHIPPED | OUTPATIENT
Start: 2024-01-17 | End: 2024-01-18

## 2024-01-17 RX ORDER — SODIUM CHLORIDE 9 MG/ML
INJECTION, SOLUTION INTRAVENOUS CONTINUOUS
Status: DISCONTINUED | OUTPATIENT
Start: 2024-01-17 | End: 2024-01-17

## 2024-01-17 RX ORDER — ISOPROTERENOL HYDROCHLORIDE 0.2 MG/ML
INJECTION, SOLUTION INTRAVENOUS
Status: COMPLETED
Start: 2024-01-17 | End: 2024-01-17

## 2024-01-17 RX ORDER — ACETAMINOPHEN 325 MG/1
650 TABLET ORAL EVERY 4 HOURS PRN
Status: DISCONTINUED | OUTPATIENT
Start: 2024-01-17 | End: 2024-01-17

## 2024-01-17 NOTE — OPERATIVE REPORT
[facility]    Cardiac Electrophysiology Procedure Note    William Thomas Lombardo Location:Cath Lab Suites   [facility] [id number] MRN [id number]   Admission Date 1/17/2024 Procedure Date 1/17/2024   Attending Physician Lynn Manzo MD Procedure Physician Lynn Manzo MD       Preprocedure Diagnosis: Chronic systolic CHF, Ischemic cardiomyopathy, Syncope    Postprocedure Diagnosis: Chronic systolic CHF, Ischemic cardiomyopathy, Syncope, Sinus node dysfunction, Ventricular fibrillation    Procedures:   1) Electrophysiology study on and off IV isoproterenol, left atrial stimulation and recording from coronary sinus catheterization, catheter-based arrhythmia mapping, and arrhythmia induction   2) Femoral venous closure devices x2  3) Implantation of a MRI-conditional dual-chamber implantable cardioverter- defibrillator ([employer]), right atrial pacemaker lead, and right ventricular ICD lead  4) Ventricular fibrillation induction and defibrillation threshold testing    : Lynn Manzo M.D.     Anesthesia: Moderate conscious sedation was provided by me from 08:26 to 10:10.  Versed, fentanyl.    Estimated blood loss: 10 mL.     Complications: None apparent.     Access: Right femoral vein - 6Fr x2.     Findings: The patient was transferred to the operating room in the postabsorptive and stable state. A procedural pause was performed to confirm the patient's identity and the site and type of surgery. Sedation was administered. The groins were prepped and draped in a sterile fashion. 1% lidocaine was administered in the right groin. Using the modified Seldinger technique, sheaths were advanced into the femoral veins as detailed above. Catheters were placed in the high right atrium, region of the bundle of His, coronary sinus, and right midventricular and apical septum at different times of the study.  An electrophysiology study was performed on and off IV isoproterenol. At baseline, the patient  was in sinus rhythm, with sinus cycle length 1300 ms,  ms, HV 53 ms.  AV block cycle length 500 ms, decremental, no preexcitation. VA block cycle length >600 ms, AVNERP 600/390 ms, RAERP 600/270 ms.  Longest SNRT ( ms) was 2171 ms, with cSNRT 871 ms. Ventricular programmed stimulation results: RV apex, 600/200, 600/200/180, 600/210/180/180, 600/210/180/180/180, 400/190, 400/180/180.  With stimulation at 400/190/180/180, sustained ventricular tachycardia/flutter occurred with  ms, hemodynamic instability and syncope. He received external defibrillation at 360 J, with conversion to atrial fibrillation with controlled rates. Catheters and sheaths were removed, and hemostasis obtained with Vascade MVP devices and manual compression. There were no hematomas.   Next, the chest was prepped and draped in a sterile fashion. 1% lidocaine was administered in the left chest, and an incision was made medial to the deltopectoral groove. Electrocautery was used to create a small inferomedially directed prepectoral pocket. The left axillary vein was accessed using venography, fluoroscopy, and the modified Seldinger technique, with 2 J-tipped guide wires advanced into the inferior vena cava.  A 8-Yoruba sheath was advanced over one wire into the axillary vein, and through this the right ventricular lead was advanced to the right ventricular septum, confirmed in the Faroese and WAYNE fluoroscopic perspectives. Lead stability and electrical parameters were satisfactory, and with 10 V output there was no extracardiac stimulation.  The lead was secured to the pectoralis muscle using 3 separate ties of 0-ethibond suture and the collar. A 6-Yoruba sheath was advanced over the second wire, and through this the right atrial lead was advanced with a passive J mechanism to the right atrial appendage.  He was externally cardioverted from atrial fibrillation to sinus rhythm. Atrial lead stability and electrical parameters were  satisfactory, and with 10 V output there was no extracardiac stimulation. The lead was secured to the pectoralis muscle using 3 separate ties of 0-ethibond suture and the collar. Gentle tugging of both leads confirmed stable tie-downs. The generator was connected to the lead, with visual inspection and gentle tugging confirming a secure connection. The device pocket was irrigated with saline solution, and hemostasis was excellent. The device was placed within the pocket. Next, we performed DFT testing.  With a 50 Hz burst, ventricular fibrillation was induced. This was sensed well by the device in the least sensitive mode. After an appropriate charge time, with satisfactory impedance, 14 Joules was delivered and converted the patient to atrial-paced rhythm. The incision was closed with deep and intermediate layers of 2-0 Vicryl suture, the subcuticular layer was approximated with Steri-strips, and a sterile bandage placed over the site. The device was interrogated via the external , with satisfactory findings. The patient was transported to the recovery area in comfortable and stable condition.    Device Settings:  DDDR  ppm  VF at 220 bpm, ATP/41J, 41J x7      RESULTS:  1) EP study on and off IV isoproterenol: abnormal sinus node function, normal AV conduction, no accessory pathways, post-defibrillation atrial fibrillation, inducible sustained ventricular tachycardia  2) Implantation of a MRI-conditional dual-chamber ICD  3) Satisfactory defibrillation threshold <= 14 Joules    PLAN:  - Sedation recovery.  - Right leg access site care: bed rest with head flat for 2 hours, and 7 days of lifting restrictions.  - CXR in recovery, and remote ICD interrogation in AM.  - No heparin, lovenox, or other anticoagulants for 48 hours.  - Incision care as directed.  Keep dry for 5 days.  - Driving restrictions as instructed.  - Follow-up in [facility] for incision check in 7-10 days.  - Arm restrictions for 3  months, with arm immobilizer sling at night for 1 month.  - Oral antibiotics x 24 hours (script sent)    Lynn Manzo M.D.   Cardiac Electrophysiology     1/17/2024  -----------------------------------------

## 2024-01-17 NOTE — DISCHARGE INSTRUCTIONS
ICD implant Discharge Instructions    Activity    Plan to rest tonight and tomorrow.  Avoid drinking alcohol for next 24 hours.  Do not make any critical decisions or sign any legal documents for the next 24 hours  Do not drive after procedure until 1-week device check appointment, unless otherwise instructed by your cardiologist.  Wear sling for next 24 hours, then only at night as needed for 30 days to help assist with arm restrictions while sleeping.    Arm Restrictions For 30 days after implant:    Do NOT lift arm with implanted device above your head or behind your back. Arm is to remain below your shoulder and in front of your body.  Do NOT lift more than 10 lbs with affected arm  Do NOT perform repetitive cycling motion with affected arm (swimming, golfing, bowling, tennis, exercise machines, raking, vacuuming, snow shoveling).  Do NOT perform repetitive cycling motion for 90 days total    Incision Care/Bathing    Keep incision site completely dry for 5 days after surgery. After day 5, you can remove top dressing and shower, letting clean soapy water run over incision area, patting dry.  The white steri-strips placed directly over the incision will gradually fall off over the next few weeks and can be physically removed after 3 weeks. Do not take them off before this time.   If you have a zipline dressing, this will be removed by device nurse or MD in 4 weeks  Keep procedure site clean and dry, do not apply any ointments, creams, lotions to the incision.  Look at your incision daily to monitor for signs and symptoms of infection (redness, swelling, drainage, foul odor from procedure site)  Do not cover incision with extra dressings or gauze unless otherwise instructed.  Do not submerge incision in water, take whirlpool baths or swim for 30 days or until site is completely healed.    When to notify your physician:    If you have chest pain, shortness of breath or persistent cough  If you experience any  signs of fever (temperature >101), chills, infection (redness, swelling, thick yellow drainage, foul order from procedure site)  New or worsening swelling of arm with implanted device  If an ICD was inserted and you receive a shock and have no symptoms, call [facility] device clinic. If you have symptoms (fainting, near fainting, dizziness, lightheadedness, chest pain, shortness of breath, palpitations), call 911.    [facility] Device Clinic Direct Line: [phone] [Monday-Friday. 8:30AM-4PM]    [facility] Main Office: [phone] [Monday- Friday 8AM-5PM]    EP study - venous acess Discharge Instructions    Proper skin puncture site care is important during the healing period. This guideline should help to remind you of the verbal instructions you received from your physician or nurse.    Site: right groin    What is Normal?    The procedure site may appear bruised or discolored    The procedure site may be tender to the touch    There may be a small amount of drainage on the bandage     Site Care:  Leave bandage in place for 24 hours. Then, gently wash with soap and water. Do no put any other bandage, ointment, powders, or creams to the site.  For 5 days after the procedure, make sure the wrist is not submerged in water or any liquid.  If bleeding occurs, elevate the hand above the heart and apply local pressure  For local swelling: apply ice    May call answering service at [phone] for any problems or concerns     When to contact your physician  Call right away if you experience any of the following    Bleeding can occur at the procedure site - both on the outside of the skin and/or beneath the surface of the skin  Swelling or a large lump at the procedure site can occur, which may be accompanied by moderate to severe pain    If either of the above occurs, apply pressure to the procedure site with 2-3 fingers, as instructed by the nurse.  Hold pressure for 20 minutes and the bleeding should stop.  Notify your  physician of the occurrence  If the bleeding does not stop, call 911 and continue to apply pressure    If you experience signs of a fever, temperature > 101°, chills, infection (redness, swelling, thick yellow drainage, or a foul odor from the procedure site)  If you notice any numbness, tingling, or loss of feeling to your leg or foot or groin access  If you notice any numbness, tingling, or loss of feeling to your fingers or hand, if wrist access was utilized      Other  You may resume your present diet, unless otherwise specified by your physician.  You may resume all of your medications as prescribed, unless otherwise directed by your physician.  A list of your medications was provided to you at discharge.  Continue the walking program initiated in the hospital and progress your walking as directed.  Or, gradually resume your previous aerobic exercise schedule as tolerated.  Make a follow up appointment with your cardiologist. You should be seen in 7-10 days

## 2024-01-17 NOTE — IVS NOTE
DISCHARGE NOTE      Pt is able to sit up and ambulate without difficulty.   Pt voided and tolerated fluids and food.   Left chest procedural site dressing remains clean dry and intact,  Periwound soft, no hematoma noted.  Right groin procedural site remains clean, dry and intact,   No signs and symptoms of bleeding/hematoma noted.   Instruction provided, patient/family verbalizes understanding.   Dr. Manzo spoke with patient/family post procedure.      Follow up Appointment: as already scheduled and reviewed with pt and family at discharge teaching.     Antibiotics prescription sent and available for pick-up.

## 2024-01-31 RX ORDER — EZETIMIBE 10 MG/1
10 TABLET ORAL NIGHTLY
Qty: 90 TABLET | Refills: 0 | Status: SHIPPED | OUTPATIENT
Start: 2024-01-31

## 2024-01-31 NOTE — TELEPHONE ENCOUNTER
Refill passed per [facility] protocol --> 90-day supply sent without refills; patient overdue for advised visit per result note/message for lipid panel.     Requested Prescriptions   Pending Prescriptions Disp Refills    ezetimibe 10 MG Oral Tab [Pharmacy Med Name: EZETIMIBE 10MG TABLETS] 90 tablet 0     Sig: TAKE 1 TABLET(10 MG) BY MOUTH EVERY NIGHT       Cholesterol Medication Protocol Passed - 1/30/2024  8:16 AM        Passed - ALT in past 12 months        Passed - LDL in past 12 months        Passed - Last ALT < 80     Lab Results   Component Value Date    ALT 55 (H) 12/22/2023             Passed - Last LDL < 130     Lab Results   Component Value Date    LDL 62 12/22/2023             Passed - In person appointment or virtual visit in the past 12 mos or appointment in next 3 mos     Recent Outpatient Visits              3 months ago Attention deficit hyperactivity disorder (ADHD), unspecified ADHD type    [facility] Amaya Rosenbaum APRN    Office Visit    7 months ago Closed nondisplaced fracture of phalanx of right little finger with routine healing, unspecified phalanx, subsequent encounter    [facility] Gm Barone MD    Office Visit    7 months ago     [facility]    Nurse Only    9 months ago Routine physical examination    [facility] Taryn Eason MD    Office Visit    11 months ago COVID    [facility] Diamond Oneil MD    Telemedicine

## 2024-02-26 ENCOUNTER — TELEPHONE (OUTPATIENT)
Dept: FAMILY MEDICINE CLINIC | Facility: CLINIC | Age: 64
End: 2024-02-26

## 2024-02-26 NOTE — TELEPHONE ENCOUNTER
Dr. Eason - upon pending the Viagra, Alert showed hat Viagra not under preferred formulary        Cost-Based Alternatives:    Tadalafil 5mg tablets   Tadalafil 2.5mg Tablets    Please advise on most appropriate alternative, Thank you!

## 2024-02-26 NOTE — TELEPHONE ENCOUNTER
Sildenafil Citrate (VIAGRA) 50 MG Oral Tab, Take 1 tablet (50 mg total) by mouth daily as needed for Erectile Dysfunction., Disp: 12 tablet, Rfl: 3

## 2024-02-26 NOTE — TELEPHONE ENCOUNTER
Please let patient know about this denial.  Tadalafil doses that are approved are not for as needed for ED.  They are the recommended doses for daily use for ED.  Recommend patient either continue with Viagra out-of-pocket, or let me know if you would like to try daily tadalafil.

## 2024-02-26 NOTE — TELEPHONE ENCOUNTER
I called patient to make aware of Dr. Eason's message below--> he is electing to use GoodRx coupon. He will call us if he has any issues. Patient verbalized understanding. No further questions or concerns at this time.

## 2024-03-18 ENCOUNTER — OFFICE VISIT (OUTPATIENT)
Dept: FAMILY MEDICINE CLINIC | Facility: CLINIC | Age: 64
End: 2024-03-18
Payer: COMMERCIAL

## 2024-03-18 VITALS
WEIGHT: 158 LBS | HEART RATE: 60 BPM | TEMPERATURE: 97 F | OXYGEN SATURATION: 98 % | HEIGHT: 68 IN | DIASTOLIC BLOOD PRESSURE: 70 MMHG | SYSTOLIC BLOOD PRESSURE: 124 MMHG | BODY MASS INDEX: 23.95 KG/M2

## 2024-03-18 DIAGNOSIS — T14.8XXA HEMATOMA AND CONTUSION: Primary | ICD-10-CM

## 2024-03-18 PROCEDURE — 3078F DIAST BP <80 MM HG: CPT

## 2024-03-18 PROCEDURE — 3074F SYST BP LT 130 MM HG: CPT

## 2024-03-18 PROCEDURE — 99213 OFFICE O/P EST LOW 20 MIN: CPT

## 2024-03-18 PROCEDURE — 3008F BODY MASS INDEX DOCD: CPT

## 2024-03-18 RX ORDER — METOPROLOL SUCCINATE 25 MG/1
TABLET, EXTENDED RELEASE ORAL
COMMUNITY
Start: 2024-01-19

## 2024-03-18 RX ORDER — ESCITALOPRAM OXALATE 20 MG/1
TABLET ORAL
COMMUNITY
Start: 2023-12-15

## 2024-03-18 NOTE — PROGRESS NOTES
William Thomas Lombardo is a 63 year old male.  Chief Complaint   Patient presents with    Bruising     Bruised right arm a couple of weeks ago and still feels tender to touch.     HPI:   William Thomas Lombardo presented to the clinic for bruise to the right elbow x 2 weeks. Associated tenderness to the touch. Knot/swelling. Starting to improve.  Has not tried any OTC. Taking xeralto as prescribed.      Current Outpatient Medications   Medication Sig Dispense Refill    escitalopram 20 MG Oral Tab       metoprolol succinate ER 25 MG Oral Tablet 24 Hr metoprolol succinate ER 25 mg tablet,extended release 24 hr, [RxNorm: 434682]      LORazepam 1 MG Oral Tab Take 1 tablet (1 mg total) by mouth nightly as needed. 90 tablet 0    ezetimibe 10 MG Oral Tab Take 1 tablet (10 mg total) by mouth nightly. 90 tablet 0    rivaroxaban (XARELTO) 20 MG Oral Tab Take 1 tablet (20 mg total) by mouth daily with food.      amphetamine-dextroamphetamine 10 MG Oral Tab Take 1 tablet (10 mg total) by mouth as needed.      atorvastatin 80 MG Oral Tab Take 1 tablet (80 mg total) by mouth nightly. (Patient taking differently: Take 1 tablet (80 mg total) by mouth daily.) 90 tablet 3    Sildenafil Citrate (VIAGRA) 50 MG Oral Tab Take 1 tablet (50 mg total) by mouth daily as needed for Erectile Dysfunction. 12 tablet 3    LOSARTAN 25 MG Oral Tab TAKE 1 TABLET(25 MG) BY MOUTH DAILY 90 tablet 3      Past Medical History:   Diagnosis Date    Attention deficit hyperactivity disorder (ADHD)     Coronary atherosclerosis     Heart attack (HCC) 2017    High blood pressure     High cholesterol     Lipoma of neck     2006 excision      Past Surgical History:   Procedure Laterality Date    COLONOSCOPY  2013    nl colonscopy    FRACTURE SURGERY      OTHER SURGICAL HISTORY  2006    excision of lipoma right neck    OTHER SURGICAL HISTORY      pinning, L finger injury      Social History:  Social History     Socioeconomic History    Marital status:     Tobacco Use    Smoking status: Never    Smokeless tobacco: Never   Vaping Use    Vaping Use: Never used   Substance and Sexual Activity    Alcohol use: Yes     Alcohol/week: 12.0 standard drinks of alcohol     Types: 12 Standard drinks or equivalent per week    Drug use: No    Sexual activity: Yes     Partners: Female   Other Topics Concern    Caffeine Concern Yes     Comment: coffee, 1 cup daily      No family history on file.   No Known Allergies     REVIEW OF SYSTEMS:   Review of Systems   Constitutional: Negative.    Respiratory: Negative.  Negative for shortness of breath.    Cardiovascular: Negative.  Negative for chest pain and palpitations.   Skin:  Positive for color change (bruise to the right elbow).   Neurological: Negative.  Negative for dizziness and headaches.   Psychiatric/Behavioral: Negative.     All other systems reviewed and are negative.     Wt Readings from Last 5 Encounters:   03/18/24 158 lb (71.7 kg)   12/22/23 148 lb (67.1 kg)   10/17/23 155 lb (70.3 kg)   06/23/23 154 lb (69.9 kg)   04/11/23 152 lb 6 oz (69.1 kg)     Body mass index is 24.02 kg/m².      EXAM:   /70 (BP Location: Right arm, Patient Position: Sitting, Cuff Size: adult)   Pulse 60   Temp 97.2 °F (36.2 °C) (Temporal)   Ht 5' 8\" (1.727 m)   Wt 158 lb (71.7 kg)   SpO2 98%   BMI 24.02 kg/m²   Physical Exam  Vitals and nursing note reviewed.   Constitutional:       Appearance: Normal appearance.   HENT:      Head: Normocephalic and atraumatic.   Cardiovascular:      Rate and Rhythm: Normal rate and regular rhythm.      Pulses: Normal pulses.      Heart sounds: Normal heart sounds.   Pulmonary:      Effort: Pulmonary effort is normal.      Breath sounds: Normal breath sounds.   Musculoskeletal:      Right elbow: Normal range of motion. Tenderness present.        Arms:       Comments: No visible bruising.   Tenderness  swelling   Neurological:      General: No focal deficit present.      Mental Status: He is alert and  oriented to person, place, and time.   Psychiatric:         Mood and Affect: Mood normal.         Behavior: Behavior normal.            ASSESSMENT AND PLAN:   (T14.8XXA) Hematoma and contusion  (primary encounter diagnosis)  Plan: patient with swelling, brushing, and tenderness to the elbow/forearm after injury. Patient on chronic blood thinners. Palpable \"knot\" with tenderness. HPI and exam consistent with hematoma and contusion. Advised ice. Compression.  If no improvement in 2 weeks follow up.     Follow up as needed     The patient indicates understanding of these issues and agrees to the plan.    This note was prepared using Dragon [employer] voice recognition dictation software. As a result errors may occur. When identified these errors have been corrected. While every attempt is made to correct errors during dictation discrepancies may still exist.

## 2024-03-28 ENCOUNTER — TELEPHONE (OUTPATIENT)
Dept: ORTHOPEDICS | Age: 64
End: 2024-03-28

## 2024-03-28 DIAGNOSIS — Z96.611 S/P REVERSE TOTAL SHOULDER ARTHROPLASTY, RIGHT: Primary | ICD-10-CM

## 2024-04-04 RX ORDER — ESCITALOPRAM OXALATE 20 MG/1
20 TABLET ORAL DAILY
Qty: 90 TABLET | Refills: 3 | Status: SHIPPED | OUTPATIENT
Start: 2024-04-04

## 2024-04-04 NOTE — TELEPHONE ENCOUNTER
Last RX dispensed 12/15/23 from an RX dated 10/18/22.   [place] INPATIENT ENCOUNTER  INTERNAL MEDICINE DAILY PROGRESS NOTE    ADMISSION DATE:  5/23/2017  DATE:  5/24/2017  CURRENT HOSPITAL DAY:  Hospital Day: 2  ATTENDING PHYSICIAN:  Aroldo Birch MD  CODE STATUS:  No Resuscitation with Maximal Medical Support    CHIEF COMPLAINT:  Jose G Aquino is a 89 year old male patient admitted with COPD exacerbation (CMS/[facility]) [J44.1]  Dyspnea, unspecified type [R06.00]     INTERVAL HISTORY:    Jose G Aquino is a 89 year old male presenting with increasing shortness of breath and cough. Patient was recently admitted from 1/10/2017 to 1/16/2017 and 1/19/-1/20/17 for bilateral pneumonia suspicious for aspiration pneumonia as well as acute hypoxic respiratory failure and lactic acidosis. Pulmonology was consulted, speech therapy recommended modified diet and he was discharged home on augmentin. He has been dong well since. He was sen in the ED on 5/19/17 for cough and fever. The work up was essentially negative, no infiltrates seen on the CXR and he was diagnosed with bronchitis and send home on azithromycin. Despite taking this medication, he has continued to declined.      When seen in the ED, his blood pressure is elevated at 185/79 but afebrile at 98.8°F. Pulse ox 93% on room air. Chest x-ray showed no acute disease but central vessels better defined compared 5/19/2017. No acute infiltrate seen. EKG showing nonspecific findings of normal sinus rhythm. prolonged QT noted. CBC revealed chronic anemia with H&H of 10.9 over 30.5. No leukocytosis. CMP showed hypernatremia with sodium level 146, creatinine 1.39, BUN 50. Troponin was elevated at 0.08. BNP elevated at 320. Lactic acid elevated 2.2.  The case was discussed with Dr. Birch who agreed to admit the patient to medical floor for further evaluation and treatment.    Upon admission, she was transferred to the medical floor. He is initiated on IV Zosyn. Speech therapy was consulted as there is high risk for aspiration. A swallow study  was done and showed that he had clear signs of aspiration continuing, butcan change from when last evaluated. It was recommended that he continue on a nectar thick liquid dysphagia easy chew diet. Pulmonology was consulted. Patient notes that he has improvement in both his cough, shortness of breath and wheezing today. DuoNeb treatments have been given every 4-6 hours with relief. His lactic acidosis has improved. Pro-calcitonin noted to be elevated at 0.15. His troponin was noted to be elevated at  0.08 then increasing to 0.12, then 0.2, then  0.30. Cardiology is following and had a repeat one at 1800 hrs. echocardiogram was done and showed ejection fraction of 64% with no significant findings. RSVP noted to be elevated at 50.6. Telemetry shows that he remains in atrial flutter. TSH was checked and noted to be low at 0.019 and T4 at 1.3.    REVIEW OF SYSTEMS:     Review of Systems   Constitutional: Negative for chills and fever.   Respiratory: Positive for cough, sputum production, shortness of breath and wheezing.         Improved compared to admission     Cardiovascular: Negative for chest pain, palpitations and leg swelling.   Gastrointestinal: Negative for abdominal pain, nausea and vomiting.   Genitourinary: Negative for dysuria.   Neurological: Negative for dizziness and headaches.       ACTIVE PROBLEMS:  Current active problems, past history, social history and family history reviewed.  Active Hospital Problems    Diagnosis Date Noted   • Acute respiratory failure with hypoxia (CMS/[facility]) 01/11/2017     Priority: Low   • Hemiparesis affecting right side as late effect of stroke (CMS/[facility]) 01/26/2016     Priority: Low   • Dysphagia 01/26/2016     Priority: Low     Secondary to CVA 12/15     • CVA, old, dysarthria 01/26/2016     Priority: Low   • History of tobacco use 01/25/2016     Priority: Low   • PAF (paroxysmal atrial fibrillation) (CMS/[facility]) 12/11/2015     Priority: Low   • Long term (current) use of  anticoagulants 12/11/2015     Priority: Low   • Chronic venous insufficiency 07/13/2015     Priority: Low   • CKD (chronic kidney disease) 10/01/2014     Priority: Low   • Iron deficiency anemia 06/09/2014     Priority: Low   • Hypothyroidism 03/06/2014     Priority: Low   • Chronic airway obstruction, not elsewhere classified 08/05/2013     Priority: Low   • DVT (deep venous thrombosis) (CMS/[facility]) 11/27/2012     Priority: Low   • Second degree heart block 11/13/2012     Priority: Low   • Hypertension 09/13/2012     Priority: Low   • Hyperlipidemia 09/13/2012     Priority: Low   • CAD (coronary artery disease) 09/13/2012     Priority: Low       MEDICATIONS/INFUSIONS:  reviewed  Current Facility-Administered Medications   Medication   • sodium chloride (PF) 0.9 % injection 2 mL   • fluticasone-salmeterol (ADVAIR DISKUS) 100-50 MCG/DOSE inhaler 1 puff   • albuterol (VENTOLIN) nebulizer 2.5 mg   • albuterol inhaler 2 puff   • atorvastatin (LIPITOR) tablet 20 mg   • docusate calcium (SURFAK) capsule 240 mg   • ferrous sulfate tablet 325 mg   • furosemide (LASIX) tablet 40 mg   • latanoprost (XALATAN) 0.005 % ophthalmic solution 1 drop   • levothyroxine (SYNTHROID, LEVOTHROID) tablet 125 mcg   • nystatin (MYCOSTATIN) cream   • pantoprazole (PROTONIX) EC tablet 40 mg   • potassium chloride (KLOR-CON) packet 20 mEq   • silver sulfADIAZINE (THERMAZENE) 1 % cream   • tolterodine (DETROL) tablet 2 mg   • rivaroxaban (XARELTO) tablet 15 mg   • sodium chloride (PF) 0.9 % injection 2 mL   • potassium chloride (K-DUR,KLOR-CON) CR tablet 20 mEq   • potassium chloride 20 mEq/100mL IVPB premix   • potassium chloride (K-DUR,KLOR-CON) CR tablet 40 mEq   • potassium chloride 20 mEq/100mL IVPB premix   • magnesium sulfate 1 g in dextrose 5% 100 mL IVPB premix   • magnesium sulfate 2 g in sodium chloride 0.9% IVPB   • acetaminophen (TYLENOL) tablet 650 mg   • hydrALAZINE (APRESOLINE) injection 10 mg   • hydrALAZINE (APRESOLINE) tablet 10  mg   • albuterol-ipratropium 2.5 mg/0.5 mg (DUONEB) nebulizer solution 3 mL   • albuterol-ipratropium 2.5 mg/0.5 mg (DUONEB) nebulizer solution 3 mL   • piperacillin-tazobactam (ZOSYN) 3.375 g in sodium chloride 0.9 % 100 mL IVPB   • methylPREDNISolone (solu-MEDROL) PF injection 40 mg   • sodium chloride (NORMAL SALINE) 0.9 % bolus 500 mL   • nitroGLYcerin (NITROSTAT) sublingual tablet 0.4 mg   • dextrose 5 % / sodium chloride 0.45% infusion   • ondansetron (ZOFRAN) injection 4 mg   • piperacillin-tazobactam (ZOSYN) 3.375 g in sodium chloride 0.9 % 100 mL IVPB         OBJECTIVE:    VITAL SIGNS:     Vital Last Value 24 Hour Range   Temperature 98 °F (36.7 °C) (05/24/17 0816) Temp  Min: 97.4 °F (36.3 °C)  Max: 98 °F (36.7 °C)   Pulse 54 (05/24/17 0816) Pulse  Min: 54  Max: 87   Respiratory 19 (05/24/17 0816) Resp  Min: 16  Max: 20   Non-Invasive  Blood Pressure 155/67 (05/24/17 0816) BP  Min: 135/60  Max: 161/70   Pulse Oximetry 94 % (05/24/17 0816) SpO2  Min: 89 %  Max: 97 %     Vital Today Admitted   Weight 71 kg (05/24/17 0552) Weight: 79.4 kg (05/23/17 0835)   Height N/A Height: 5' 3\" (160 cm) (05/23/17 0835)   BMI N/A BMI (Calculated): 31.06 (05/23/17 0835)     PHYSICAL EXAMINATION:   VITAL SIGNS:    Visit Vitals   • /67 (BP Location: [facility] [place], [place], Patient Position: Semi-Ramirez's)   • Pulse 54   • Temp 98 °F (36.7 °C) (Oral)   • Resp 19   • Ht 5' 3\" (1.6 m)   • Wt 71 kg   • SpO2 94%   • BMI 27.73 kg/m2     GENERAL: This is a 89 year old male in no acute distress resting in bed.   PSYCH: He is awake alert and oriented to time, place and person. Mood and affect are appropiate  HEAD: Appears to be atraumatic and normocephalic   EYES: Reveal no icterus, no scleral injection, no conjunctival pallor. Extraocular movements appear to be intact     LUNGS: Reveals good effort. On room air. Coarse breath sounds throughout but improved and with less wheezing.   HEART: Reveals presence of first and second heart sounds.  ABDOMEN:   Normoactive bowel sounds. Soft and nontender. There is no rebound tenderness.    EXTREMITIES: No clubbing, cyanosis or edema  NEUROLOGIC: chronic hemiparesis and dysphagia noted and unchanged from baseline.  Sensation is intact  SKIN: Appears unremarkable and no rashes are present      INTAKE/OUTPUT:    Last stool occurrence:PTA    Date 05/23/17 0700 - 05/24/17 0659 05/24/17 0700 - 05/25/17 0659   Shift 0634-0038 9536-2974 5934-8697 24 Hour Total 6793-5246 8097-9198 3839-3705 24 Hour Total   I  N  T  A  K  E   P.O.  240 0 240        I.V.  556.5 256 812.5        IV Piggyback   100 100        Shift Total  796.5 356 1152.5       O  U  T  P  U  T   Urine          Shift Total         Weight (kg) 72 72 71 71 71 71 71 71       LABORATORY DATA:      Recent Labs  Lab 05/24/17  0406 05/23/17  0840 05/19/17  1015   SODIUM 145 146* 145   POTASSIUM 3.1* 3.5 3.7   CHLORIDE 108* 109* 109*   CO2 25 23 27   BUN 47* 50* 32*   CREATININE 1.26* 1.39* 1.20*   GLUCOSE 185* 105* 108*   ALBUMIN  --  3.3* 3.0*   AST  --  16 19   BILIRUBIN  --  0.4 0.4   TSH 0.019*  --   --        WBC (K/mcL)   Date Value   05/24/2017 4.7     RBC (mil/mcL)   Date Value   05/24/2017 3.31 (L)     HCT (%)   Date Value   05/24/2017 31.5 (L)     HGB (g/dL)   Date Value   05/24/2017 10.3 (L)     PLT (K/mcL)   Date Value   05/24/2017 209   12/20/2013 275          IMAGING STUDIES:    XR Chest AP or PA   Final Result   IMPRESSION: No acute disease.       Central vessels appear better defined today compared to 5/19/2017         FL ESOPHAGRAM    (Results Pending)   FL Video Swallow Study    (Results Pending)                               ASSESSMENT/PLAN:     1. Shortness of breath/cough and fever likely aspiration pneumonia - lactic acidosis, cxr still with no infiltrates  -continue iv zosyn  -duoneb treatments q 4-6 hours with respiratory and prn  -follow blood cultures  -respiratory sputum cultures   -turn cough incentive spirometry  -flutter  therapy  -supplemental O2 to keep SPO2 90-92%  -consult pulmonology and discussed with Dr. Dodge  -VFSS results noted- continue nectar thick liquids and dysphasia easy chew diet, no straws  -repeat CBC, BMP, BNP and procalcitonin in the AM  -speech therapy consult for continued therapy  -DNR -MMS  -PT and OT consulted  -repeat BNP in the Am  -IV lasix 40mg now and restart home lasix 40mg bid  -avoid fluid overload  -monitor I&O  -daily weights          2. Elevated BNP- 320  -IV lasix 40mg now and restart home lasix 40mg bid  -see above  -cap IVF after 24 hours  -avoid further fluid overload  -Repeat BNP in the morning      3. History of paroxysmal atrial fibrillation, current in atrial flutter. on chronic anticoagulation with xarelto. History of second degree heart block as well.  -on xarelto  -monitor on telemetry  -consult cardiology      4. chronic renal failure , at baseline. 1.39  Baseline creatinine around 1.0-1.3.   - avoid milk of magnesia, mylanta, fleet enemas and NSAIDs  - monitor Creatinine carefully  - BMP in the AM      5. Recent CVA with residual dysarthria, dysphagia and right sided hemiparesis  - pt and ot  -continue home medications  -speech therapy consult      6. CAD with history of bypass  -continue home medications      7. COPD  -continue home medications and inhaler  -Continue with current smoke-free lifestyle  -DuoNeb's p.r.n.      8. Chronic venous insufficiency  - oral Stockings and DVT prophylaxis      9. Hypertension, controlled  -continue home medications  -monitor blood pressure carefully  -hold home medications if SBP <130  -IV hydralazine 5mg q 6 hours prn for SBP >160  -monitor electrolytes and supplement per protocol prn      10. Hypothyroidism. TSH low at 0.019, T4 1 0.3.  -We'll decrease levothyroxine from 125 µg daily 100 g daily  -repeat TSH and T4 in 4-8 weeks as an outpatient      11. Iron deficiency anemia, hgb near baseline  -cbc in the am, monitor hgb  -continue home  medications      12. Hyperlipidemia  -continue home medications      13. History of tobacco use  -continue with current smoke free lifestyle      14. History of Coccyx ulcer, stage III  -continue with wound care if still present  -offloading , and repositioning q 2 hours  -Wound care consulted  -Fried placed     15. Elevated troponin 0.08 with prolonged Qt noted on EKG, troponin continuing to increase  -monitor on telemetry  -consult cardiology , discussed with Dr. Rodriguez  -Repeat troponin at 1800 hrs.                  DVT PROPHYLAXIS:   VTE Pharmacologic Prophylaxis:  on xareto  VTE Mechanical Prophylaxis:  Yes, Sequential compression device(s)    GI PROPHYLAXIS:  Protonix    DIET:  No Straws -  Note Continuous  Constant Supervision  Mechanical Soft; Thickened Liquids - Nectar; Extra Sauces/gravies With Solids. Meds Whole In Sauce. Diet     Discussed with or notes reviewed : RN, Patient,Dr. Birch  Consultant(s)-Dr. Dodge and Dr. Michael Manriquez PA-C/ Dr. Aroldo Birch MD  5/24/2017  9:21 AM  ______________________________________________________________________  Patient seen and examined and I agree with findings, assessment and plan. I have participated in patient's evaluation and edited above note. I have formulated the assessment and plan and have discussed this with patient and staff.    Examination:   GENERAL: Oriented times 3.   HEENT: Normocephalic, atraumatic. Pupils are equal, reactive to light and accommodation. Extraocular muscles intact. Sclerae anicteric, conjunctivae pink.  NECK: Supple. There is no anterior, posterior or cervical adenopathy. There is no submandibular adenopathy. There is no supraclavicular adenopathy. No thyromegaly. Good carotid upstroke bilaterally. No carotid bruits.   LUNGS: Bilaterally symmetrical and equal on expansion. On auscultation the lung fields are clear.   HEART: Regular rate and rhythm. Normal S1 and S2. No murmurs, gallops, rubs, or  clicks. The point of maximum impulse is not displaced.   ABDOMINAL: Soft. Bowel sounds positive. Nontender times 4 quadrants. No hepatosplenomegaly.   BACK: No tenderness of the cervical, dorsal, or lumbar spine. There is no CVA (costovertebral angle)  tenderness.   EXTREMITIES: No clubbing, cyanosis, or edema.   NEUROLOGICAL: Motor and sensory intact.  SKIN: Warm and dry. There is no evidence of rash or vesicles.    Labs, diagnostics and medications reviewed in EPIC.    PLAN; VIDEO SWALLOW STUDY  CONT. ZOSYN  CAP IVF  AS NOTED ABOVE  Aroldo Birch MD

## 2024-04-04 NOTE — TELEPHONE ENCOUNTER
Refill passed per [facility] protocol.    Requested Prescriptions   Pending Prescriptions Disp Refills    escitalopram 20 MG Oral Tab 90 tablet 3     Sig: Take 1 tablet (20 mg total) by mouth daily.       Psychiatric Non-Scheduled (Anti-Anxiety) Passed - 4/2/2024  9:17 PM        Passed - In person appointment or virtual visit in the past 6 mos or appointment in next 3 mos     Recent Outpatient Visits              2 weeks ago Hematoma and contusion    [facility] Amaya Rosenbaum APRN    Office Visit    5 months ago Attention deficit hyperactivity disorder (ADHD), unspecified ADHD type    [facility]    Office Visit    9 months ago Closed nondisplaced fracture of phalanx of right little finger with routine healing, unspecified phalanx, subsequent encounter    [facility] Gm Barone MD    Office Visit    9 months ago     [facility]    Nurse Only    11 months ago Routine physical examination    [facility] Taryn Eason MD    Office Visit                      Passed - Depression Screening completed within the past 12 months              Recent Outpatient Visits              2 weeks ago Hematoma and contusion    [facility] Amaya Rosenbaum APRN    Office Visit    5 months ago Attention deficit hyperactivity disorder (ADHD), unspecified ADHD type    [facility] Amaya Rosenbaum, PRITESH    Office Visit    9 months ago Closed nondisplaced fracture of phalanx of right little finger with routine healing, unspecified phalanx, subsequent encounter    [facility] Gm Barone MD    Office Visit    9 months ago     [facility]     Nurse Only    11 months ago [facility]ine physical examination    [facility]    [facility] Visit

## 2024-04-16 ENCOUNTER — TELEPHONE (OUTPATIENT)
Dept: FAMILY MEDICINE CLINIC | Facility: CLINIC | Age: 64
End: 2024-04-16

## 2024-04-16 NOTE — TELEPHONE ENCOUNTER
Pt is scheduled a pre-op clearance visit June 18th; because he is out of town June 4-June 14th.    Is this enough time prior to surgery?  Call pt if needs to be changed:  [phone]   Okay to leave a voice message

## 2024-04-18 NOTE — TELEPHONE ENCOUNTER
Patient has surgery 6/25/24, appointment for pre op is 6/18/24, patient aware ok to keep that appointment since he will be out of town.

## 2024-04-19 ENCOUNTER — PREP FOR CASE (OUTPATIENT)
Dept: ORTHOPEDICS | Age: 64
End: 2024-04-19

## 2024-04-19 DIAGNOSIS — M25.311 INSTABILITY OF RIGHT SHOULDER JOINT: ICD-10-CM

## 2024-04-19 DIAGNOSIS — Z96.611 S/P REVERSE TOTAL SHOULDER ARTHROPLASTY, RIGHT: Primary | ICD-10-CM

## 2024-05-14 PROBLEM — I49.5 SSS (SICK SINUS SYNDROME) (HCC): Status: ACTIVE | Noted: 2024-05-14

## 2024-05-14 PROBLEM — I48.0 PAROXYSMAL ATRIAL FIBRILLATION (HCC): Status: ACTIVE | Noted: 2023-12-22

## 2024-05-16 ENCOUNTER — EXTERNAL RECORD (OUTPATIENT)
Dept: HEALTH INFORMATION MANAGEMENT | Facility: OTHER | Age: 64
End: 2024-05-16

## 2024-05-20 ENCOUNTER — NURSE ONLY (OUTPATIENT)
Facility: CLINIC | Age: 64
End: 2024-05-20

## 2024-05-20 NOTE — PROGRESS NOTES
Contacted and spoke with patient.     Due to significant cardiac history, unable to proceed with TCS. Informed him of the rationale for the office visit and he verbalized understanding.     Made office visit appointment on 6/17/2024 at 4:00 pm with Marifer Valdivia PA-C at [facility]. Informed him of date/time/location & that this information will be on [employer]. He verbalized understanding.

## 2024-05-27 ENCOUNTER — PATIENT MESSAGE (OUTPATIENT)
Dept: FAMILY MEDICINE CLINIC | Facility: CLINIC | Age: 64
End: 2024-05-27

## 2024-05-27 DIAGNOSIS — F41.1 GENERALIZED ANXIETY DISORDER: ICD-10-CM

## 2024-05-28 RX ORDER — LORAZEPAM 1 MG/1
1 TABLET ORAL NIGHTLY PRN
Qty: 90 TABLET | Refills: 1 | Status: SHIPPED | OUTPATIENT
Start: 2024-05-28

## 2024-05-28 NOTE — TELEPHONE ENCOUNTER
From: William Thomas Lombardo  To: Trayn FIELDIssa Yumi  Sent: 5/27/2024 9:10 PM CDT  Subject: lorazepam    Hi, I'm sending this because I just ordered a refill of Lorazepam and it always requires approval, and I wanted to make sure I get it approved so that I can pick it up on Tuesday, May 28. I ran out of it tonight.    thank you.

## 2024-06-18 ENCOUNTER — OFFICE VISIT (OUTPATIENT)
Dept: FAMILY MEDICINE CLINIC | Facility: CLINIC | Age: 64
End: 2024-06-18

## 2024-06-18 VITALS
DIASTOLIC BLOOD PRESSURE: 72 MMHG | WEIGHT: 152 LBS | TEMPERATURE: 97 F | BODY MASS INDEX: 22.77 KG/M2 | OXYGEN SATURATION: 94 % | HEART RATE: 60 BPM | HEIGHT: 68.5 IN | SYSTOLIC BLOOD PRESSURE: 114 MMHG

## 2024-06-18 DIAGNOSIS — L81.9 PIGMENTED SKIN LESION OF UNCERTAIN BEHAVIOR OF TORSO: ICD-10-CM

## 2024-06-18 DIAGNOSIS — I48.0 PAROXYSMAL ATRIAL FIBRILLATION (HCC): ICD-10-CM

## 2024-06-18 DIAGNOSIS — Z01.818 PREOPERATIVE GENERAL PHYSICAL EXAMINATION: Primary | ICD-10-CM

## 2024-06-18 DIAGNOSIS — M25.311 INSTABILITY OF RIGHT SHOULDER JOINT: ICD-10-CM

## 2024-06-18 DIAGNOSIS — Z96.611 STATUS POST REVERSE ARTHROPLASTY OF SHOULDER, RIGHT: ICD-10-CM

## 2024-06-18 LAB
ALBUMIN SERPL-MCNC: 4.8 G/DL (ref 3.2–4.8)
ALBUMIN/GLOB SERPL: 2.2 {RATIO} (ref 1–2)
ALP LIVER SERPL-CCNC: 85 U/L
ALT SERPL-CCNC: 38 U/L
ANION GAP SERPL CALC-SCNC: 9 MMOL/L (ref 0–18)
AST SERPL-CCNC: 37 U/L (ref ?–34)
BILIRUB SERPL-MCNC: 0.9 MG/DL (ref 0.2–1.1)
BUN BLD-MCNC: 16 MG/DL (ref 9–23)
BUN/CREAT SERPL: 14.8 (ref 10–20)
CALCIUM BLD-MCNC: 9.6 MG/DL (ref 8.7–10.4)
CHLORIDE SERPL-SCNC: 107 MMOL/L (ref 98–112)
CO2 SERPL-SCNC: 25 MMOL/L (ref 21–32)
CREAT BLD-MCNC: 1.08 MG/DL
DEPRECATED RDW RBC AUTO: 40.3 FL (ref 35.1–46.3)
EGFRCR SERPLBLD CKD-EPI 2021: 78 ML/MIN/1.73M2 (ref 60–?)
ERYTHROCYTE [DISTWIDTH] IN BLOOD BY AUTOMATED COUNT: 12.4 % (ref 11–15)
FASTING STATUS PATIENT QL REPORTED: NO
GLOBULIN PLAS-MCNC: 2.2 G/DL (ref 2–3.5)
GLUCOSE BLD-MCNC: 86 MG/DL (ref 70–99)
HCT VFR BLD AUTO: 43.3 %
HGB BLD-MCNC: 15.1 G/DL
MCH RBC QN AUTO: 30.8 PG (ref 26–34)
MCHC RBC AUTO-ENTMCNC: 34.9 G/DL (ref 31–37)
MCV RBC AUTO: 88.4 FL
OSMOLALITY SERPL CALC.SUM OF ELEC: 292 MOSM/KG (ref 275–295)
PLATELET # BLD AUTO: 265 10(3)UL (ref 150–450)
POTASSIUM SERPL-SCNC: 4.2 MMOL/L (ref 3.5–5.1)
PROT SERPL-MCNC: 7 G/DL (ref 5.7–8.2)
RBC # BLD AUTO: 4.9 X10(6)UL
SODIUM SERPL-SCNC: 141 MMOL/L (ref 136–145)
WBC # BLD AUTO: 8.5 X10(3) UL (ref 4–11)

## 2024-06-18 PROCEDURE — 3074F SYST BP LT 130 MM HG: CPT | Performed by: FAMILY MEDICINE

## 2024-06-18 PROCEDURE — 3008F BODY MASS INDEX DOCD: CPT | Performed by: FAMILY MEDICINE

## 2024-06-18 PROCEDURE — 11300 SHAVE SKIN LESION 0.5 CM/<: CPT | Performed by: FAMILY MEDICINE

## 2024-06-18 PROCEDURE — 99214 OFFICE O/P EST MOD 30 MIN: CPT | Performed by: FAMILY MEDICINE

## 2024-06-18 PROCEDURE — 3078F DIAST BP <80 MM HG: CPT | Performed by: FAMILY MEDICINE

## 2024-06-18 RX ORDER — LORAZEPAM 1 MG/1
1 TABLET ORAL NIGHTLY PRN
COMMUNITY
Start: 2023-12-21

## 2024-06-18 RX ORDER — ATORVASTATIN CALCIUM 80 MG/1
80 TABLET, FILM COATED ORAL DAILY
COMMUNITY
Start: 2023-12-21

## 2024-06-18 RX ORDER — ESCITALOPRAM OXALATE 20 MG/1
1 TABLET ORAL DAILY
COMMUNITY
Start: 2024-04-04

## 2024-06-18 RX ORDER — METOPROLOL SUCCINATE 25 MG/1
12.5 TABLET, EXTENDED RELEASE ORAL 2 TIMES DAILY
COMMUNITY
Start: 2024-01-19

## 2024-06-18 RX ORDER — EZETIMIBE 10 MG/1
10 TABLET ORAL DAILY
COMMUNITY

## 2024-06-18 ASSESSMENT — ACTIVITIES OF DAILY LIVING (ADL)
ADL_SCORE: 12
ADL_BEFORE_ADMISSION: INDEPENDENT

## 2024-06-18 NOTE — PROGRESS NOTES
Subjective:   Patient ID: William Thomas Lombardo is a 63 year old male.    Patient presents for preoperative physical.  Scheduled with Dr. Pemberton for revision of arthroplasty on 6/25.        History/Other:   Review of Systems   Constitutional: Negative.    Respiratory: Negative.     Cardiovascular: Negative.    Gastrointestinal: Negative.    Skin: Negative.    Neurological: Negative.      Patient Active Problem List   Diagnosis    Anxiety disorder    Hyperlipidemia    Nephrolithiasis    Macular edema    History of colonoscopy    Septal myocardial infarction (HCC)    Attention deficit disorder (ADD) without hyperactivity    Preoperative evaluation to rule out surgical contraindication    CAD (coronary artery disease)    Essential hypertension    Paroxysmal atrial fibrillation (HCC)    SSS (sick sinus syndrome) (HCC)      Current Outpatient Medications   Medication Sig Dispense Refill    LORazepam 1 MG Oral Tab Take 1 tablet (1 mg total) by mouth nightly as needed. 90 tablet 1    escitalopram 20 MG Oral Tab Take 1 tablet (20 mg total) by mouth daily. 90 tablet 3    metoprolol succinate ER 25 MG Oral Tablet 24 Hr metoprolol succinate ER 25 mg tablet,extended release 24 hr, [RxNorm: 444314]      ezetimibe 10 MG Oral Tab Take 1 tablet (10 mg total) by mouth nightly. 90 tablet 0    rivaroxaban (XARELTO) 20 MG Oral Tab Take 1 tablet (20 mg total) by mouth daily with food.      amphetamine-dextroamphetamine 10 MG Oral Tab Take 1 tablet (10 mg total) by mouth as needed.      atorvastatin 80 MG Oral Tab Take 1 tablet (80 mg total) by mouth nightly. (Patient taking differently: Take 1 tablet (80 mg total) by mouth daily.) 90 tablet 3    LOSARTAN 25 MG Oral Tab TAKE 1 TABLET(25 MG) BY MOUTH DAILY 90 tablet 3    Sildenafil Citrate (VIAGRA) 50 MG Oral Tab Take 1 tablet (50 mg total) by mouth daily as needed for Erectile Dysfunction. 12 tablet 3     Allergies:No Known Allergies    Objective:   Physical Exam  Constitutional:        Appearance: He is well-developed.      Comments: /72   Pulse 60   Temp 97.3 °F (36.3 °C) (Oral)   Ht 5' 8.5\" (1.74 m)   Wt 152 lb (68.9 kg)   SpO2 94%   BMI 22.77 kg/m²     Cardiovascular:      Rate and Rhythm: Normal rate and regular rhythm.      Heart sounds: Normal heart sounds.   Pulmonary:      Effort: Pulmonary effort is normal.      Breath sounds: Normal breath sounds.   Abdominal:      General: Bowel sounds are normal.      Palpations: Abdomen is soft.   Skin:     General: Skin is warm and dry.      Comments: Left upper back near midline with 5 mm darkly pigmented slightly raised lesion with irregular border.   Neurological:      Mental Status: He is alert and oriented to person, place, and time.      Deep Tendon Reflexes: Reflexes are normal and symmetric.         Assessment & Plan:   1. Preoperative general physical examination-patient scheduled for revision of right shoulder arthroplasty due to recurrent instability, surgeon Dr. Victoriano Pemberton, 6/25/2024.  He has received cardiac clearance.  He is medically cleared for surgery with low risk of complications.  Reviewed with instructions regarding discontinuing Xarelto 2 days prior to surgery.   2. Status post reverse arthroplasty of shoulder, right-surgery as above   3. Instability of right shoulder joint-surgery as above   4. Paroxysmal atrial fibrillation (HCC)-status post ablation and pacemaker insertion.  Regular rate and rhythm today.  Has received cardiology clearance.   5. Pigmented skin lesion of uncertain behavior of torso-shave excision done today, pathology sent.       Orders Placed This Encounter   Procedures    CBC, Platelet; No Differential    Comp Metabolic Panel (14) [E]    Specimen to Pathology, Tissue    MRSA Screen by PCR       Meds This Visit:  Requested Prescriptions      No prescriptions requested or ordered in this encounter       Imaging & Referrals:  None

## 2024-06-19 LAB — MRSA DNA SPEC QL NAA+PROBE: NEGATIVE

## 2024-06-19 NOTE — PROCEDURES
Written informed consent obtained. Aseptic technique. Local 1% lidocaine with epinephrine. Lesion shave with Dermablade. The base cauterized with silver nitrate. Sterile dressing. Procedure tolerated well. Wound instructions given.

## 2024-06-20 ENCOUNTER — TELEPHONE (OUTPATIENT)
Dept: ORTHOPEDICS | Age: 64
End: 2024-06-20

## 2024-06-20 ENCOUNTER — APPOINTMENT (OUTPATIENT)
Dept: ORTHOPEDICS | Age: 64
End: 2024-06-20

## 2024-06-25 ENCOUNTER — ANESTHESIA (OUTPATIENT)
Dept: SURGERY | Age: 64
End: 2024-06-25

## 2024-06-25 ENCOUNTER — HOSPITAL ENCOUNTER (OUTPATIENT)
Age: 64
Discharge: HOME | End: 2024-06-25
Attending: ORTHOPAEDIC SURGERY | Admitting: ORTHOPAEDIC SURGERY

## 2024-06-25 ENCOUNTER — ANESTHESIA EVENT (OUTPATIENT)
Dept: SURGERY | Age: 64
End: 2024-06-25

## 2024-06-25 VITALS
HEIGHT: 68 IN | TEMPERATURE: 97.5 F | OXYGEN SATURATION: 99 % | SYSTOLIC BLOOD PRESSURE: 124 MMHG | DIASTOLIC BLOOD PRESSURE: 68 MMHG | RESPIRATION RATE: 16 BRPM | BODY MASS INDEX: 23.05 KG/M2 | WEIGHT: 152.12 LBS | HEART RATE: 60 BPM

## 2024-06-25 DIAGNOSIS — Z96.611 INSTABILITY OF REVERSE TOTAL ARTHROPLASTY OF RIGHT SHOULDER (CMD): Primary | ICD-10-CM

## 2024-06-25 DIAGNOSIS — T84.028A INSTABILITY OF REVERSE TOTAL ARTHROPLASTY OF RIGHT SHOULDER (CMD): Primary | ICD-10-CM

## 2024-06-25 PROCEDURE — 10004452 HB PACU ADDL 30 MINUTES: Performed by: ORTHOPAEDIC SURGERY

## 2024-06-25 PROCEDURE — 13000003 HB ANESTHESIA  GENERAL EA ADD MINUTE: Performed by: ORTHOPAEDIC SURGERY

## 2024-06-25 PROCEDURE — 13000002 HB ANESTHESIA  GENERAL  S/U + 1ST 15 MIN: Performed by: ORTHOPAEDIC SURGERY

## 2024-06-25 PROCEDURE — 10002801 HB RX 250 W/O HCPCS: Performed by: NURSE ANESTHETIST, CERTIFIED REGISTERED

## 2024-06-25 PROCEDURE — 10002803 HB RX 637: Performed by: ANESTHESIOLOGY

## 2024-06-25 PROCEDURE — 10006023 HB SUPPLY 272: Performed by: ORTHOPAEDIC SURGERY

## 2024-06-25 PROCEDURE — 10002800 HB RX 250 W HCPCS: Performed by: ORTHOPAEDIC SURGERY

## 2024-06-25 PROCEDURE — 10006027 HB SUPPLY 278: Performed by: ORTHOPAEDIC SURGERY

## 2024-06-25 PROCEDURE — 23473 REVIS RECONST SHOULDER JOINT: CPT | Performed by: PHYSICIAN ASSISTANT

## 2024-06-25 PROCEDURE — C1776 JOINT DEVICE (IMPLANTABLE): HCPCS | Performed by: ORTHOPAEDIC SURGERY

## 2024-06-25 PROCEDURE — 13000118 HB ORTHO MAJOR COMPLEX CASE S/U + 1ST 15 MIN: Performed by: ORTHOPAEDIC SURGERY

## 2024-06-25 PROCEDURE — 10002800 HB RX 250 W HCPCS: Performed by: NURSE ANESTHETIST, CERTIFIED REGISTERED

## 2024-06-25 PROCEDURE — 10004451 HB PACU RECOVERY 1ST 30 MINUTES: Performed by: ORTHOPAEDIC SURGERY

## 2024-06-25 PROCEDURE — 13000119 HB ORTHO MAJOR COMPLEX CASE EA ADD MINUTE: Performed by: ORTHOPAEDIC SURGERY

## 2024-06-25 PROCEDURE — 10002800 HB RX 250 W HCPCS: Performed by: ANESTHESIOLOGY

## 2024-06-25 PROCEDURE — 13000001 HB PHASE II RECOVERY EA 30 MINUTES: Performed by: ORTHOPAEDIC SURGERY

## 2024-06-25 PROCEDURE — 23473 REVIS RECONST SHOULDER JOINT: CPT | Performed by: ORTHOPAEDIC SURGERY

## 2024-06-25 PROCEDURE — 10002807 HB RX 258: Performed by: NURSE ANESTHETIST, CERTIFIED REGISTERED

## 2024-06-25 DEVICE — IMPLANTABLE DEVICE: Type: IMPLANTABLE DEVICE | Site: SHOULDER | Status: FUNCTIONAL

## 2024-06-25 RX ORDER — HYDRALAZINE HYDROCHLORIDE 20 MG/ML
5 INJECTION INTRAMUSCULAR; INTRAVENOUS EVERY 10 MIN PRN
Status: DISCONTINUED | OUTPATIENT
Start: 2024-06-25 | End: 2024-06-25 | Stop reason: HOSPADM

## 2024-06-25 RX ORDER — DEXTROSE MONOHYDRATE 25 G/50ML
25 INJECTION, SOLUTION INTRAVENOUS PRN
Status: DISCONTINUED | OUTPATIENT
Start: 2024-06-25 | End: 2024-06-25 | Stop reason: HOSPADM

## 2024-06-25 RX ORDER — LIDOCAINE HYDROCHLORIDE 10 MG/ML
5 INJECTION, SOLUTION EPIDURAL; INFILTRATION; INTRACAUDAL; PERINEURAL PRN
Status: DISCONTINUED | OUTPATIENT
Start: 2024-06-25 | End: 2024-06-25 | Stop reason: HOSPADM

## 2024-06-25 RX ORDER — LIDOCAINE HYDROCHLORIDE 40 MG/ML
SOLUTION TOPICAL PRN
Status: DISCONTINUED | OUTPATIENT
Start: 2024-06-25 | End: 2024-06-25

## 2024-06-25 RX ORDER — DEXAMETHASONE SODIUM PHOSPHATE 10 MG/ML
INJECTION, SOLUTION INTRAMUSCULAR; INTRAVENOUS
Status: COMPLETED | OUTPATIENT
Start: 2024-06-25 | End: 2024-06-25

## 2024-06-25 RX ORDER — HYDROCODONE BITARTRATE AND ACETAMINOPHEN 5; 325 MG/1; MG/1
1 TABLET ORAL EVERY 4 HOURS PRN
Status: DISCONTINUED | OUTPATIENT
Start: 2024-06-25 | End: 2024-06-25 | Stop reason: HOSPADM

## 2024-06-25 RX ORDER — SODIUM CHLORIDE, SODIUM LACTATE, POTASSIUM CHLORIDE, CALCIUM CHLORIDE 600; 310; 30; 20 MG/100ML; MG/100ML; MG/100ML; MG/100ML
INJECTION, SOLUTION INTRAVENOUS CONTINUOUS PRN
Status: DISCONTINUED | OUTPATIENT
Start: 2024-06-25 | End: 2024-06-25

## 2024-06-25 RX ORDER — NICOTINE POLACRILEX 4 MG
30 LOZENGE BUCCAL
Status: DISCONTINUED | OUTPATIENT
Start: 2024-06-25 | End: 2024-06-25 | Stop reason: HOSPADM

## 2024-06-25 RX ORDER — NALOXONE HCL 0.4 MG/ML
0.2 VIAL (ML) INJECTION EVERY 5 MIN PRN
Status: DISCONTINUED | OUTPATIENT
Start: 2024-06-25 | End: 2024-06-25 | Stop reason: HOSPADM

## 2024-06-25 RX ORDER — LIDOCAINE HYDROCHLORIDE 10 MG/ML
INJECTION, SOLUTION INFILTRATION; PERINEURAL PRN
Status: DISCONTINUED | OUTPATIENT
Start: 2024-06-25 | End: 2024-06-25

## 2024-06-25 RX ORDER — SODIUM CHLORIDE 9 MG/ML
INJECTION, SOLUTION INTRAVENOUS CONTINUOUS
Status: DISCONTINUED | OUTPATIENT
Start: 2024-06-25 | End: 2024-06-25 | Stop reason: HOSPADM

## 2024-06-25 RX ORDER — SODIUM CHLORIDE, SODIUM LACTATE, POTASSIUM CHLORIDE, CALCIUM CHLORIDE 600; 310; 30; 20 MG/100ML; MG/100ML; MG/100ML; MG/100ML
INJECTION, SOLUTION INTRAVENOUS CONTINUOUS
Status: DISCONTINUED | OUTPATIENT
Start: 2024-06-25 | End: 2024-06-25 | Stop reason: HOSPADM

## 2024-06-25 RX ORDER — HYDROCODONE BITARTRATE AND ACETAMINOPHEN 10; 325 MG/1; MG/1
1 TABLET ORAL EVERY 4 HOURS PRN
Status: DISCONTINUED | OUTPATIENT
Start: 2024-06-25 | End: 2024-06-25 | Stop reason: HOSPADM

## 2024-06-25 RX ORDER — ACETAMINOPHEN 500 MG
1000 TABLET ORAL EVERY 8 HOURS PRN
Status: DISCONTINUED | OUTPATIENT
Start: 2024-06-25 | End: 2024-06-25 | Stop reason: HOSPADM

## 2024-06-25 RX ORDER — MIDAZOLAM HYDROCHLORIDE 1 MG/ML
2 INJECTION, SOLUTION INTRAMUSCULAR; INTRAVENOUS ONCE
Status: COMPLETED | OUTPATIENT
Start: 2024-06-25 | End: 2024-06-25

## 2024-06-25 RX ORDER — ROPIVACAINE HYDROCHLORIDE 5 MG/ML
INJECTION, SOLUTION EPIDURAL; INFILTRATION; PERINEURAL
Status: COMPLETED | OUTPATIENT
Start: 2024-06-25 | End: 2024-06-25

## 2024-06-25 RX ORDER — HYDROCODONE BITARTRATE AND ACETAMINOPHEN 5; 325 MG/1; MG/1
1 TABLET ORAL EVERY 6 HOURS PRN
Qty: 20 TABLET | Refills: 0 | Status: SHIPPED | OUTPATIENT
Start: 2024-06-25

## 2024-06-25 RX ORDER — MIDAZOLAM HYDROCHLORIDE 1 MG/ML
2 INJECTION, SOLUTION INTRAMUSCULAR; INTRAVENOUS
Status: DISCONTINUED | OUTPATIENT
Start: 2024-06-25 | End: 2024-06-25 | Stop reason: HOSPADM

## 2024-06-25 RX ORDER — ONDANSETRON 2 MG/ML
INJECTION INTRAMUSCULAR; INTRAVENOUS PRN
Status: DISCONTINUED | OUTPATIENT
Start: 2024-06-25 | End: 2024-06-25

## 2024-06-25 RX ORDER — PROPOFOL 10 MG/ML
INJECTION, EMULSION INTRAVENOUS PRN
Status: DISCONTINUED | OUTPATIENT
Start: 2024-06-25 | End: 2024-06-25

## 2024-06-25 RX ORDER — TRANEXAMIC ACID 10 MG/ML
INJECTION, SOLUTION INTRAVENOUS PRN
Status: DISCONTINUED | OUTPATIENT
Start: 2024-06-25 | End: 2024-06-25

## 2024-06-25 RX ORDER — METOPROLOL SUCCINATE 25 MG/1
12.5 TABLET, EXTENDED RELEASE ORAL ONCE
Status: COMPLETED | OUTPATIENT
Start: 2024-06-25 | End: 2024-06-25

## 2024-06-25 RX ORDER — DEXTROSE MONOHYDRATE 50 MG/ML
INJECTION, SOLUTION INTRAVENOUS CONTINUOUS PRN
Status: DISCONTINUED | OUTPATIENT
Start: 2024-06-25 | End: 2024-06-25 | Stop reason: HOSPADM

## 2024-06-25 RX ORDER — DEXAMETHASONE SODIUM PHOSPHATE 4 MG/ML
INJECTION, SOLUTION INTRA-ARTICULAR; INTRALESIONAL; INTRAMUSCULAR; INTRAVENOUS; SOFT TISSUE PRN
Status: DISCONTINUED | OUTPATIENT
Start: 2024-06-25 | End: 2024-06-25

## 2024-06-25 RX ORDER — ROCURONIUM BROMIDE 10 MG/ML
INJECTION, SOLUTION INTRAVENOUS PRN
Status: DISCONTINUED | OUTPATIENT
Start: 2024-06-25 | End: 2024-06-25

## 2024-06-25 RX ADMIN — PROPOFOL 150 MG: 10 INJECTION, EMULSION INTRAVENOUS at 07:58

## 2024-06-25 RX ADMIN — ROCURONIUM BROMIDE 10 MG: 10 INJECTION INTRAVENOUS at 07:58

## 2024-06-25 RX ADMIN — TRANEXAMIC ACID 1000 MG: 10 INJECTION, SOLUTION INTRAVENOUS at 09:18

## 2024-06-25 RX ADMIN — Medication 160 MG: at 07:58

## 2024-06-25 RX ADMIN — Medication 100 MCG: at 08:57

## 2024-06-25 RX ADMIN — Medication 100 MCG: at 08:06

## 2024-06-25 RX ADMIN — SODIUM CHLORIDE, POTASSIUM CHLORIDE, SODIUM LACTATE AND CALCIUM CHLORIDE: 600; 310; 30; 20 INJECTION, SOLUTION INTRAVENOUS at 07:51

## 2024-06-25 RX ADMIN — Medication 200 MCG: at 08:16

## 2024-06-25 RX ADMIN — TRANEXAMIC ACID 1000 MG: 10 INJECTION, SOLUTION INTRAVENOUS at 08:00

## 2024-06-25 RX ADMIN — MIDAZOLAM HYDROCHLORIDE 2 MG: 1 INJECTION, SOLUTION INTRAMUSCULAR; INTRAVENOUS at 07:39

## 2024-06-25 RX ADMIN — Medication 100 MCG: at 08:43

## 2024-06-25 RX ADMIN — KETOROLAC TROMETHAMINE 15 MG: 30 INJECTION, SOLUTION INTRAMUSCULAR at 09:17

## 2024-06-25 RX ADMIN — SUGAMMADEX 200 MG: 100 INJECTION, SOLUTION INTRAVENOUS at 09:17

## 2024-06-25 RX ADMIN — LIDOCAINE HYDROCHLORIDE 1 APPLICATION.: 40 SOLUTION TOPICAL at 07:59

## 2024-06-25 RX ADMIN — CEFAZOLIN SODIUM 2000 MG: 300 INJECTION, POWDER, LYOPHILIZED, FOR SOLUTION INTRAVENOUS at 07:57

## 2024-06-25 RX ADMIN — ROPIVACAINE HYDROCHLORIDE 15 ML: 5 INJECTION, SOLUTION EPIDURAL; INFILTRATION; PERINEURAL at 07:46

## 2024-06-25 RX ADMIN — Medication 100 MCG: at 08:34

## 2024-06-25 RX ADMIN — DEXAMETHASONE SODIUM PHOSPHATE 5 MG: 10 INJECTION INTRAMUSCULAR; INTRAVENOUS at 07:46

## 2024-06-25 RX ADMIN — METOPROLOL SUCCINATE 12.5 MG: 25 TABLET, EXTENDED RELEASE ORAL at 07:34

## 2024-06-25 RX ADMIN — FENTANYL CITRATE 100 MCG: 50 INJECTION INTRAMUSCULAR; INTRAVENOUS at 07:55

## 2024-06-25 RX ADMIN — ONDANSETRON 4 MG: 2 INJECTION INTRAMUSCULAR; INTRAVENOUS at 09:17

## 2024-06-25 RX ADMIN — LIDOCAINE HYDROCHLORIDE 10 ML: 10 INJECTION, SOLUTION INFILTRATION; PERINEURAL at 07:58

## 2024-06-25 RX ADMIN — Medication 100 MCG: at 09:17

## 2024-06-25 RX ADMIN — DEXAMETHASONE SODIUM PHOSPHATE 8 MG: 4 INJECTION INTRA-ARTICULAR; INTRALESIONAL; INTRAMUSCULAR; INTRAVENOUS; SOFT TISSUE at 07:58

## 2024-06-25 RX ADMIN — ROCURONIUM BROMIDE 40 MG: 10 INJECTION INTRAVENOUS at 08:15

## 2024-06-25 ASSESSMENT — PAIN SCALES - WONG BAKER: WONGBAKER_NUMERICALRESPONSE: 0

## 2024-06-25 ASSESSMENT — ENCOUNTER SYMPTOMS: EXERCISE TOLERANCE: GOOD (>4 METS)

## 2024-06-25 ASSESSMENT — PAIN SCALES - GENERAL
PAINLEVEL_OUTOF10: 0

## 2024-06-27 RX ORDER — EZETIMIBE 10 MG/1
10 TABLET ORAL NIGHTLY
Qty: 90 TABLET | Refills: 3 | Status: SHIPPED | OUTPATIENT
Start: 2024-06-27

## 2024-06-27 RX ORDER — SILDENAFIL 50 MG/1
50 TABLET, FILM COATED ORAL
Qty: 12 TABLET | Refills: 3 | Status: SHIPPED | OUTPATIENT
Start: 2024-06-27

## 2024-06-27 NOTE — TELEPHONE ENCOUNTER
Refill passed per [facility] protocols.    Requested Prescriptions   Pending Prescriptions Disp Refills    Sildenafil Citrate (VIAGRA) 50 MG Oral Tab 12 tablet 3     Sig: Take 1 tablet (50 mg total) by mouth daily as needed for Erectile Dysfunction.       Genitourinary Medications Passed - 6/24/2024  6:29 PM        Passed - Patient does not have pulmonary hypertension on problem list        Passed - In person appointment or virtual visit in the past 12 mos or appointment in next 3 mos     Recent Outpatient Visits              [facility]    1 month ago     [facility]    Nurse Only    1 month ago Attention deficit hyperactivity disorder (ADHD), unspecified ADHD type    [facility]    Office Visit    3 months ago Hematoma and contusion    [facility] Amaya Rosenbaum APRN    Office Visit    8 months ago Attention deficit hyperactivity disorder (ADHD), unspecified ADHD type    [facility] Amaya Rosenbaum APRN    Office Visit                        ezetimibe 10 MG Oral Tab 90 tablet 3     Sig: Take 1 tablet (10 mg total) by mouth nightly.       Cholesterol Medication Protocol Passed - 6/24/2024  6:29 PM        Passed - ALT < 80     Lab Results   Component Value Date    ALT 38 06/18/2024             Passed - ALT resulted within past year        Passed - Lipid panel within past 12 months     Lab Results   Component Value Date    CHOLEST 131 12/22/2023    TRIG 116 12/22/2023    HDL 48 12/22/2023    LDL 62 12/22/2023    VLDL 17 12/22/2023    TCHDLRATIO 5.0 (H) 10/20/2020    NONHDLC 83 12/22/2023             Passed - In person appointment or virtual visit in the past 12 mos or appointment in next 3 mos      Recent Outpatient Visits              1 week ago Preoperative general physical examination    [facility], [facility] Taryn Eason MD    Office Visit    1 month ago     [facility], [facility] [place]    Nurse Only    1 month ago Attention deficit hyperactivity disorder (ADHD), unspecified ADHD type    [facility], [facility] Taryn Eason MD    Office Visit    3 months ago Hematoma and contusion    [facility], [facility] Amaya Rosenbaum APRN    Office Visit    8 months ago Attention deficit hyperactivity disorder (ADHD), unspecified ADHD type    [facility], [facility] Amaya Guzman APRN    Office Visit

## 2024-07-11 ENCOUNTER — CLINICAL ABSTRACT (OUTPATIENT)
Dept: HEALTH INFORMATION MANAGEMENT | Facility: OTHER | Age: 64
End: 2024-07-11

## 2024-07-11 ENCOUNTER — OFFICE VISIT (OUTPATIENT)
Dept: ORTHOPEDICS | Age: 64
End: 2024-07-11

## 2024-07-11 ENCOUNTER — APPOINTMENT (OUTPATIENT)
Dept: ORTHOPEDICS | Age: 64
End: 2024-07-11

## 2024-07-11 DIAGNOSIS — Z96.611 S/P REVERSE TOTAL SHOULDER ARTHROPLASTY, RIGHT: Primary | ICD-10-CM

## 2024-07-11 PROCEDURE — 99024 POSTOP FOLLOW-UP VISIT: CPT | Performed by: PHYSICIAN ASSISTANT

## 2024-08-12 DIAGNOSIS — I21.29 SEPTAL MYOCARDIAL INFARCTION (HCC): ICD-10-CM

## 2024-08-12 DIAGNOSIS — E78.00 PURE HYPERCHOLESTEROLEMIA: Chronic | ICD-10-CM

## 2024-08-12 RX ORDER — ATORVASTATIN CALCIUM 80 MG/1
80 TABLET, FILM COATED ORAL NIGHTLY
Qty: 90 TABLET | Refills: 3 | Status: SHIPPED | OUTPATIENT
Start: 2024-08-12

## 2024-08-12 NOTE — TELEPHONE ENCOUNTER
Refill passed per [facility] protocol.     Requested Prescriptions   Pending Prescriptions Disp Refills    ATORVASTATIN 80 MG Oral Tab [Pharmacy Med Name: ATORVASTATIN 80MG TABLETS] 90 tablet 3     Sig: TAKE 1 TABLET(80 MG) BY MOUTH EVERY NIGHT       Cholesterol Medication Protocol Passed - 8/12/2024  3:50 PM        Passed - ALT < 80     Lab Results   Component Value Date    ALT 38 06/18/2024             Passed - ALT resulted within past year        Passed - Lipid panel within past 12 months     Lab Results   Component Value Date    CHOLEST 131 12/22/2023    TRIG 116 12/22/2023    HDL 48 12/22/2023    LDL 62 12/22/2023    VLDL 17 12/22/2023    TCHDLRATIO 5.0 (H) 10/20/2020    NONHDLC 83 12/22/2023             Passed - In person appointment or virtual visit in the past 12 mos or appointment in next 3 mos     Recent Outpatient Visits              1 month ago Preoperative general physical examination    [facility], [facility] Taryn Eason MD    Office Visit    2 months ago     [facility]    Nurse Only    3 months ago Attention deficit hyperactivity disorder (ADHD), unspecified ADHD type    [facility] Taryn Eason MD    Office Visit    4 months ago Hematoma and contusion    [facility] Amaya Rosenbaum APRN    Office Visit    10 months ago Attention deficit hyperactivity disorder (ADHD), unspecified ADHD type    [facility] Amaya Rosenbaum APRN    Office Visit

## 2024-08-24 DIAGNOSIS — I21.29 SEPTAL MYOCARDIAL INFARCTION (HCC): ICD-10-CM

## 2024-08-24 DIAGNOSIS — E78.00 PURE HYPERCHOLESTEROLEMIA: Chronic | ICD-10-CM

## 2024-08-26 RX ORDER — LOSARTAN POTASSIUM 25 MG/1
25 TABLET ORAL DAILY
Qty: 90 TABLET | Refills: 3 | Status: SHIPPED | OUTPATIENT
Start: 2024-08-26

## 2024-08-26 NOTE — TELEPHONE ENCOUNTER
Refill Per Protocol     Requested Prescriptions   Pending Prescriptions Disp Refills    LOSARTAN 25 MG Oral Tab [Pharmacy Med Name: LOSARTAN 25MG TABLETS] 90 tablet 3     Sig: TAKE 1 TABLET(25 MG) BY MOUTH DAILY       Hypertension Medications Protocol Passed - 8/24/2024  1:00 PM        Passed - CMP or BMP in past 12 months        Passed - Last BP reading less than 140/90     BP Readings from Last 1 Encounters:   06/18/24 114/72               Passed - In person appointment or virtual visit in the past 12 mos or appointment in next 3 mos     Recent Outpatient Visits              2 months ago Preoperative general physical examination    [facility]    Office [facility]    3 months ago     [facility]    [facility]    3 months ago Attention deficit hyperactivity disorder (ADHD), unspecified ADHD type    [facility]    Office Visit    5 months ago Hematoma and contusion    [facility] Amaya Rosenbaum APRN    Office Visit    10 months ago Attention deficit hyperactivity disorder (ADHD), unspecified ADHD type    [facility]    Office Visit                      Passed - EGFRCR or GFRNAA > 50     GFR Evaluation  EGFRCR: 78 , resulted on 6/18/2024                   Recent Outpatient Visits              2 months ago Preoperative general physical examination    [facility]    Office Visit    3 months ago     [facility] [address], [place]    Nurse Only    3 months ago Attention deficit hyperactivity disorder (ADHD), unspecified ADHD type    [facility]    Office Visit    5 months ago Hematoma and contusion    [facility], Heather Harris Lauren, APRN    Office Visit    10 months ago Attention deficit hyperactivity disorder (ADHD), unspecified ADHD type    [facility], Heather Harris Lauren, APRN    Office Visit

## 2024-09-20 RX ORDER — METOPROLOL SUCCINATE 25 MG/1
TABLET, EXTENDED RELEASE ORAL
Refills: 0 | OUTPATIENT
Start: 2024-09-20

## 2024-11-19 DIAGNOSIS — F41.1 GENERALIZED ANXIETY DISORDER: ICD-10-CM

## 2024-11-20 DIAGNOSIS — F41.1 GENERALIZED ANXIETY DISORDER: ICD-10-CM

## 2024-11-22 RX ORDER — LORAZEPAM 1 MG/1
1 TABLET ORAL NIGHTLY PRN
Qty: 90 TABLET | Refills: 0 | OUTPATIENT
Start: 2024-11-22

## 2024-11-22 NOTE — TELEPHONE ENCOUNTER
Please review.  Protocol failed / Has no protocol.  Marked High Priority, patient states out of medication    Lorazepam 1mg Recent fills each quantity 90 : 5/28, 8/24  DUE 11/25/24  Last prescription written: 5/28/24 #90 +1 R  Last office visit: 6/18/24       Requested Prescriptions   Pending Prescriptions Disp Refills    LORazepam 1 MG Oral Tab 90 tablet 1     Sig: Take 1 tablet (1 mg total) by mouth nightly as needed.       Controlled Substance Medication Failed - 11/22/2024  3:40 PM        Failed - This medication is a controlled substance - forward to provider to refill             Recent Outpatient Visits              5 months ago Preoperative general physical examination    [facility], [facility] Taryn Eason MD    Office Visit    6 months ago     [facility]    [facility] Only    6 months ago Attention deficit hyperactivity disorder (ADHD), unspecified ADHD type    [facility] Taryn Shee[facility]    Office Visit    8 months ago Hematoma and contusion    [facility] Amaya Guzman APRN    Office Visit    1 year ago Attention deficit hyperactivity disorder (ADHD), unspecified ADHD type    [facility] Amaya Rosenbaum APRN    Office Visit

## 2024-11-22 NOTE — TELEPHONE ENCOUNTER
Routing to On-Call due to High Priority status and Dr. Eason is out of office.    Patient Comment: I've run out of lorazepam and requested a refill at my [facility], but as there always seems to be a delay in getting approval, I wanted to reach out to you to see if Dr. Eason could approve it so that I could pick it up today. please call me at [phone] if you have any questions. thank you.

## 2024-11-23 RX ORDER — LORAZEPAM 1 MG/1
1 TABLET ORAL NIGHTLY PRN
Qty: 30 TABLET | Refills: 1 | Status: SHIPPED | OUTPATIENT
Start: 2024-11-23

## 2025-01-21 DIAGNOSIS — F41.1 GENERALIZED ANXIETY DISORDER: ICD-10-CM

## 2025-01-22 DIAGNOSIS — F41.1 GENERALIZED ANXIETY DISORDER: ICD-10-CM

## 2025-01-26 DIAGNOSIS — F41.1 GENERALIZED ANXIETY DISORDER: ICD-10-CM

## 2025-01-27 RX ORDER — LORAZEPAM 1 MG/1
1 TABLET ORAL NIGHTLY PRN
Qty: 30 TABLET | Refills: 0 | OUTPATIENT
Start: 2025-01-27

## 2025-01-27 RX ORDER — LORAZEPAM 1 MG/1
1 TABLET ORAL NIGHTLY PRN
Qty: 30 TABLET | Refills: 1 | OUTPATIENT
Start: 2025-01-27

## 2025-01-27 RX ORDER — LORAZEPAM 1 MG/1
1 TABLET ORAL NIGHTLY PRN
Qty: 30 TABLET | Refills: 1 | Status: SHIPPED | OUTPATIENT
Start: 2025-01-27

## 2025-01-27 NOTE — TELEPHONE ENCOUNTER
Please review. Protocol Failed; No Protocol      Recent fills: 11/23/2024, 12/22/2024  Last Rx written: 11/23/2024  Last office visit: 6/18/2024    Future Appointments  Date Type Provider Dept   04/01/25 Appointment Taryn Eason MD [facility]   Showing future appointments within next 150 days with a meds authorizing provider and meeting all other requirements        Requested Prescriptions   Pending Prescriptions Disp Refills    LORazepam 1 MG Oral Tab 30 tablet 1     Sig: Take 1 tablet (1 mg total) by mouth nightly as needed.       Controlled Substance Medication Failed - 1/27/2025 12:40 PM        Failed - This medication is a controlled substance - forward to provider to refill        Passed - Medication is active on med list               Future Appointments         Provider Department Appt Notes    In 2 months Taryn Eason MD [facility] px 4/11/23          Recent Outpatient Visits              7 months ago Preoperative general physical examination    [facility] Taryn Eason MD    Office Visit    8 months ago     [facility]    Nurse Only    8 months ago Attention deficit hyperactivity disorder (ADHD), unspecified ADHD type    [facility] Taryn Eason MD    Office Visit    10 months ago Hematoma and contusion    [facility] Amaya Rosenbaum APRN    Office Visit    1 year ago Attention deficit hyperactivity disorder (ADHD), unspecified ADHD type    [facility] Amaya Rosenbaum APRN    Office Visit

## 2025-02-13 DIAGNOSIS — F41.1 GENERALIZED ANXIETY DISORDER: ICD-10-CM

## 2025-02-18 RX ORDER — SILDENAFIL 50 MG/1
50 TABLET, FILM COATED ORAL
Qty: 12 TABLET | Refills: 3 | Status: SHIPPED | OUTPATIENT
Start: 2025-02-18

## 2025-02-18 RX ORDER — LORAZEPAM 1 MG/1
1 TABLET ORAL NIGHTLY PRN
Qty: 30 TABLET | Refills: 1 | Status: CANCELLED | OUTPATIENT
Start: 2025-02-18

## 2025-02-18 NOTE — TELEPHONE ENCOUNTER
Please kindly review, this medication    [x] FAILS    [] HAS NO PROTOCOL ATTACHED    Patient comment: I still have a week's worth of pills, but it usually takes about that long to get it approved, so I'm requesting it a bit early     Patient's due date: 2/27/25  Recent fill dates: 1/27/25, 12/22/24, and 11/23/24  Date of  last written prescription: 1/27/25   Last written quantity: #30 each and processed as a 30 day supply  [x] Takes as needed  [] Takes scheduled daily    Recent Visits  Date Type Provider Dept   06/18/24 Office Visit Taryn Eason MD [facility]   05/14/24 Office Visit Taryn Eaosn MD [facility]   03/18/24 Office Visit Amaya Rosenbaum APRN [facility]     Future Appointments  Date Type Provider Dept   04/01/25 Appointment Taryn Eason MD [facility]   Showing future appointments within next 150 days with a meds authorizing provider and meeting all other requirements

## 2025-02-19 NOTE — TELEPHONE ENCOUNTER
Advised patient of Dr. Eason's note. Patient verbalized understanding.    Pharmacy  [id number] - [place] [address] AT [facility] [address], [phone], [phone]      Disp Refills Start End    LORazepam 1 MG Oral Tab 30 tablet 1 1/27/2025 --    Sig - Route: Take 1 tablet (1 mg total) by mouth nightly as needed. - Oral    Sent to pharmacy as: LORazepam 1 MG Oral Tablet (Ativan)    Notes to Pharmacy: **Patient requests 90 days supply**    E-Prescribing Status: Receipt confirmed by pharmacy (1/27/2025  6:09 PM CST)       Disp Refills Start End    Sildenafil Citrate (VIAGRA) 50 MG Oral Tab 12 tablet 3 2/18/2025 --    Sig - Route: Take 1 tablet (50 mg total) by mouth daily as needed for Erectile Dysfunction. - Oral    Sent to pharmacy as: Sildenafil Citrate 50 MG Oral Tablet (Viagra)    E-Prescribing Status: Receipt confirmed by pharmacy (2/18/2025  3:50 PM CST)

## 2025-04-01 ENCOUNTER — PATIENT MESSAGE (OUTPATIENT)
Dept: FAMILY MEDICINE CLINIC | Facility: CLINIC | Age: 65
End: 2025-04-01

## 2025-04-01 DIAGNOSIS — F41.1 GENERALIZED ANXIETY DISORDER: ICD-10-CM

## 2025-04-01 RX ORDER — LORAZEPAM 1 MG/1
1 TABLET ORAL NIGHTLY PRN
Qty: 90 TABLET | Refills: 0 | Status: CANCELLED | OUTPATIENT
Start: 2025-04-01

## 2025-04-01 NOTE — TELEPHONE ENCOUNTER
Patient calling ( name and date of birth of patient verified ) asking for refill of Lorazepam   ( states asked [facility] 4 days ago , no request noted )   patient has not  had any medication for 5 days     Patient asking for 90 day supply    Last office visit = 6//18/2024   Last refill = 2/272025      Medication pended for your review / approval for # 90    Please advise and thank you.

## 2025-04-02 RX ORDER — LORAZEPAM 1 MG/1
1 TABLET ORAL NIGHTLY PRN
Qty: 30 TABLET | Refills: 1 | Status: SHIPPED | OUTPATIENT
Start: 2025-04-02

## 2025-04-02 NOTE — TELEPHONE ENCOUNTER
Patient was no-show for visit today.  Standard of care for appointment at least every 6 months to continue prescribing this medication.  Must make appointment before further refills.

## 2025-04-02 NOTE — TELEPHONE ENCOUNTER
Dr. Eason     Please see MyChart message below and advise, thank you     See other encounters from 4//1/2025  ( refill and MyChart )     Billy Thomas Lombardo P Em Rn Triage (supporting Taryn Eason MD)11 hours ago (8:49 PM)     BL  I realize I missed an appointment tonight. 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 I made a new appt. for May 1.      must I wait until then to get a lorazepam refill? I haven't slept for more than two hours straight for 6 days. I requested a refill of lorazepam at [facility] on March 26. I am not trying to avoid a doctor's visit. All I do is make appointments with doctors.

## 2025-05-05 ENCOUNTER — OFFICE VISIT (OUTPATIENT)
Dept: FAMILY MEDICINE CLINIC | Facility: CLINIC | Age: 65
End: 2025-05-05

## 2025-05-05 VITALS
HEART RATE: 60 BPM | RESPIRATION RATE: 18 BRPM | WEIGHT: 156 LBS | DIASTOLIC BLOOD PRESSURE: 63 MMHG | SYSTOLIC BLOOD PRESSURE: 100 MMHG | BODY MASS INDEX: 23.64 KG/M2 | HEIGHT: 68 IN | TEMPERATURE: 98 F | OXYGEN SATURATION: 98 %

## 2025-05-05 DIAGNOSIS — I10 ESSENTIAL HYPERTENSION: Primary | Chronic | ICD-10-CM

## 2025-05-05 DIAGNOSIS — N20.0 RECURRENT KIDNEY STONES: ICD-10-CM

## 2025-05-05 DIAGNOSIS — Z12.11 COLON CANCER SCREENING: ICD-10-CM

## 2025-05-05 DIAGNOSIS — F41.1 GENERALIZED ANXIETY DISORDER: ICD-10-CM

## 2025-05-05 DIAGNOSIS — F98.8 ATTENTION DEFICIT DISORDER (ADD) WITHOUT HYPERACTIVITY: ICD-10-CM

## 2025-05-05 PROCEDURE — G2211 COMPLEX E/M VISIT ADD ON: HCPCS | Performed by: FAMILY MEDICINE

## 2025-05-05 PROCEDURE — 3008F BODY MASS INDEX DOCD: CPT | Performed by: FAMILY MEDICINE

## 2025-05-05 PROCEDURE — 3078F DIAST BP <80 MM HG: CPT | Performed by: FAMILY MEDICINE

## 2025-05-05 PROCEDURE — 3074F SYST BP LT 130 MM HG: CPT | Performed by: FAMILY MEDICINE

## 2025-05-05 PROCEDURE — 99213 OFFICE O/P EST LOW 20 MIN: CPT | Performed by: FAMILY MEDICINE

## 2025-05-05 RX ORDER — DEXTROAMPHETAMINE SACCHARATE, AMPHETAMINE ASPARTATE, DEXTROAMPHETAMINE SULFATE AND AMPHETAMINE SULFATE 2.5; 2.5; 2.5; 2.5 MG/1; MG/1; MG/1; MG/1
10 TABLET ORAL 2 TIMES DAILY
Qty: 60 TABLET | Refills: 0 | Status: SHIPPED | OUTPATIENT
Start: 2025-05-05 | End: 2025-06-04

## 2025-05-05 RX ORDER — LORAZEPAM 0.5 MG/1
0.5 TABLET ORAL 2 TIMES DAILY
Qty: 180 TABLET | Refills: 1 | Status: SHIPPED | OUTPATIENT
Start: 2025-05-05

## 2025-05-05 NOTE — PROGRESS NOTES
Subjective:   William Thomas Lombardo is a 64 year old female who presents for Medication Follow-Up (Pt is here for f/u on Lorazepam)       History/Other:   History of Present Illness  Billy Thomas Lombardo is a 64 year old male who presents for follow-up on chronic medication management for generalized anxiety disorder.    He continues to take lorazepam 1 mg daily and Lexapro 20 mg daily for anxiety management. His anxiety is reasonably well controlled with this regimen, although he experiences disturbed sleep if he does not take lorazepam. He has attempted to decrease his lorazepam dosage in the past but found it challenging.    He recently resumed running, starting with an eight-mile run at a slow pace, which he found enjoyable. 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    He had shoulder revision surgery prior to his last visit, which was successful in addressing subluxation issues. He has been focusing on improving his range of motion in the gym and has not yet returned to playing tennis.    He experienced a kidney stone episode, which he described as 'brutal' but felt relief once it passed. A CT scan revealed a 4 mm stone in the left kidney and multiple nonobstructing stones. He has not had a comprehensive kidney stone workup, such as a 24-hour urine test, to assess for excretion of calcium or oxalate.    He is due for colon cancer screening and has opted for the Cologuard stool test, which is less invasive than a colonoscopy.    He uses Adderall on an as-needed basis, primarily when he feels scattered, but notes that these days are rare. He does not use it while writing.    He gave up alcohol for Lent and has been more moderate in his consumption since then. He acknowledges the potential risks of combining alcohol with lorazepam.      Chief Complaint Reviewed and Verified  Nursing Notes Reviewed and   Verified  Tobacco Reviewed   Allergies Reviewed  Medications Reviewed    Problem List Reviewed  Social History Reviewed         Tobacco:  He has never smoked tobacco.    Current Medications[1]           Review of Systems:  See above      Objective:   /63 (BP Location: Left arm, Patient Position: Sitting, Cuff Size: adult)   Pulse 60   Temp 97.8 °F (36.6 °C) (Temporal)   Resp 18   Ht 5' 8\" (1.727 m)   Wt 156 lb (70.8 kg)   SpO2 98%   BMI 23.72 kg/m²    Estimated body mass index is 23.72 kg/m² as calculated from the following:    Height as of this encounter: 5' 8\" (1.727 m).    Weight as of this encounter: 156 lb (70.8 kg).  Results  RADIOLOGY  No results found.       Physical Exam  Physical Exam  GENERAL: No acute distress, well developed, well nourished.  CHEST: Lungs clear to auscultation bilaterally.  CARDIOVASCULAR: Regular rate and rhythm, no murmurs.  EXTREMITIES: No edema in extremities.      Assessment & Plan:   1. Essential hypertension (Primary)  2. Generalized anxiety disorder  -     LORazepam; Take 1 tablet (0.5 mg total) by mouth 2 (two) times daily.  Dispense: 180 tablet; Refill: 1  3. Recurrent kidney stones  -     UROLOGY - INTERNAL  4. Colon cancer screening  -     COLOGUARD COLON CANCER SCREENING (EXTERNAL)  5. Attention deficit disorder (ADD) without hyperactivity  -     Amphetamine-Dextroamphetamine; Take 1 tablet (10 mg total) by mouth 2 (two) times daily.  Dispense: 60 tablet; Refill: 0      Assessment & Plan  Assessment & Plan  Generalized anxiety disorder  Anxiety symptoms are better controlled with Lexapro 20 mg daily and lorazepam 1 mg at bedtime. Difficulty in reducing lorazepam dosage in the past. Discussed potential reduction of lorazepam to 0.75 mg at bedtime for 2-3 months, with a possible further decrease to 0.5 mg if tolerated. Emphasized the importance of monitoring anxiety symptoms and maintaining regular follow-up appointments to manage controlled substances.  - Continue Lexapro 20 mg  daily.  - Prescribe lorazepam 0.5 mg tablets, 180 tablets with a refill, to allow for dose adjustment to 0.75 mg at bedtime.  - Monitor anxiety symptoms and adjust lorazepam dosage as tolerated.    Essential hypertension  Blood pressure is well controlled with losartan and metoprolol.    ADD, using Adderall infrequently  Adderall is used infrequently, primarily on days when feeling scattered. Current prescription is for short-acting 10 mg as needed. Advised to inform cardiology of Adderall use during next visit to ensure ongoing awareness.  - Prescribe Adderall 10 mg, to be taken twice a day as needed.  - Remind cardiology of Adderall use during next visit.    Recurrent kidney stones  Recent episode of a 4 mm stone in the left kidney with multiple nonobstructing stones. Discussed the potential for future episodes and the importance of hydration and dietary modifications. Consideration for a 24-hour urine test to evaluate for metabolic causes of stone formation. Discussed the possibility of testing the stone composition if preserved appropriately.  - Refer to urology for further evaluation and potential 24-hour urine testing.  - Advise increased water intake with lemon juice .    Colon cancer screening  Due for colon cancer screening. Discussed options including Cologuard test, which is 95% accurate for detecting colon cancer and requires testing every three years if normal, and colonoscopy, which is 100% accurate and requires testing every ten years if normal. Opted for Cologuard test due to convenience.  - Order Cologuard test and ensure he receives the kit.            Return in about 6 months (around 11/5/2025) for Routine physical.      Taryn Eason MD                [1]   Current Outpatient Medications   Medication Sig Dispense Refill    amphetamine-dextroamphetamine (ADDERALL) 10 MG Oral Tab Take 1 tablet (10 mg total) by mouth 2 (two) times daily. 60 tablet 0    LORazepam 0.5 MG Oral Tab Take 1 tablet (0.5  mg total) by mouth 2 (two) times daily. 180 tablet 1    Sildenafil Citrate (VIAGRA) 50 MG Oral Tab Take 1 tablet (50 mg total) by mouth daily as needed for Erectile Dysfunction. 12 tablet 3    losartan 25 MG Oral Tab Take 1 tablet (25 mg total) by mouth daily. 90 tablet 3    atorvastatin 80 MG Oral Tab Take 1 tablet (80 mg total) by mouth nightly. 90 tablet 3    ezetimibe 10 MG Oral Tab Take 1 tablet (10 mg total) by mouth nightly. 90 tablet 3    escitalopram 20 MG Oral Tab Take 1 tablet (20 mg total) by mouth daily. 90 tablet 3    metoprolol succinate ER 25 MG Oral Tablet 24 Hr metoprolol succinate ER 25 mg tablet,extended release 24 hr, [RxNorm: 564012]      rivaroxaban (XARELTO) 20 MG Oral Tab Take 1 tablet (20 mg total) by mouth daily with food.

## 2025-05-27 ENCOUNTER — TELEPHONE (OUTPATIENT)
Dept: FAMILY MEDICINE CLINIC | Facility: CLINIC | Age: 65
End: 2025-05-27

## 2025-05-27 RX ORDER — PENICILLIN V POTASSIUM 500 MG/1
500 TABLET, FILM COATED ORAL 3 TIMES DAILY
Qty: 30 TABLET | Refills: 0 | Status: SHIPPED | OUTPATIENT
Start: 2025-05-27 | End: 2025-06-06

## 2025-06-03 RX ORDER — ESCITALOPRAM OXALATE 20 MG/1
20 TABLET ORAL DAILY
Qty: 90 TABLET | Refills: 1 | Status: SHIPPED | OUTPATIENT
Start: 2025-06-03

## 2025-08-14 DIAGNOSIS — E78.00 PURE HYPERCHOLESTEROLEMIA: Chronic | ICD-10-CM

## 2025-08-14 DIAGNOSIS — I21.29 SEPTAL MYOCARDIAL INFARCTION (HCC): ICD-10-CM

## 2025-08-18 RX ORDER — ATORVASTATIN CALCIUM 80 MG/1
80 TABLET, FILM COATED ORAL NIGHTLY
Qty: 90 TABLET | Refills: 3 | Status: SHIPPED | OUTPATIENT
Start: 2025-08-18

## (undated) DIAGNOSIS — M12.811 RIGHT ROTATOR CUFF TEAR ARTHROPATHY: Primary | ICD-10-CM

## (undated) DIAGNOSIS — M75.101 RIGHT ROTATOR CUFF TEAR ARTHROPATHY: Primary | ICD-10-CM

## (undated) DIAGNOSIS — R00.1 BRADYCARDIA: Primary | ICD-10-CM

## (undated) DEVICE — SUTURE VICRYL 2-0 FS-1

## (undated) DEVICE — GLOVE SURG 8 PROTEXIS LF BLUE PF SMTH BEAD CUFF INTLK STRL

## (undated) DEVICE — SYSTEM WND IRR IRRISEPT DBRD CLNSG 0.05% CHG STRL LF

## (undated) DEVICE — SOLUTION IRR 500ML 0.9% NACL PLASTIC POUR BTL ISTNC N-PYRG

## (undated) DEVICE — ELECTRODE ESURG 2.75IN EZ CLN

## (undated) DEVICE — GLOVE SURG 6.5 PROTEXIS PI LF CRM PF BEAD CUFF STRL PLISPRN

## (undated) DEVICE — TOWEL SURG OR 17X30IN BLUE

## (undated) DEVICE — SUTURE FIBERWIRE S AR-7200

## (undated) DEVICE — SUTURE ETHIBOND EXCEL 0 CT1 18IN CNTRL RELS BRAID 8 STRN ABS CX21D

## (undated) DEVICE — DRAPE SRG 90X60IN BCK TBL CVR

## (undated) DEVICE — SPONGE LAP 18X18IN 7IN LOOP

## (undated) DEVICE — PIN GUID 3MM 75MM STRL

## (undated) DEVICE — ADHESIVE DRMBND ADV .7ML LIQUID APL MCBL BRR FLXB 2 OCTYL

## (undated) DEVICE — DRESSING FOAM 8X4IN POSTOP MPLX BR SLVR STRL DISP

## (undated) DEVICE — ELECTRODE ESURG BLADE PNCL 10FT 38IN TUBE ADPR PORT

## (undated) DEVICE — WATER STRL 1000ML PLASTIC POUR BTL LF

## (undated) DEVICE — BIT DRL 3.2MM STRL PERI SCR

## (undated) DEVICE — HOOD: Brand: FLYTE

## (undated) DEVICE — SUTURE ETHIBOND 2 V-37

## (undated) DEVICE — DRAPE SHT FNFLD 98X77IN XL SURG CNVRT STRL LF DISP PINK

## (undated) DEVICE — SUTURE ETHILON 4-0 1667G

## (undated) DEVICE — SUTURE VICRYL 0 CT-1

## (undated) DEVICE — GOWN SURG 2XL XLONG L4 RAGLAN SLV BRTHBL STRL LF DISP

## (undated) DEVICE — SUTURE 2-0 COATED VICRYL UNDYED 1X27IN FS-1

## (undated) DEVICE — DRAPE SRG 70X60IN SPLT U IMPRV

## (undated) DEVICE — 450 ML BOTTLE OF 0.05% CHLORHEXIDINE GLUCONATE IN 99.95% STERILE WATER FOR IRRIGATION, USP AND APPLICATOR.: Brand: IRRISEPT ANTIMICROBIAL WOUND LAVAGE

## (undated) DEVICE — SHOULDER: Brand: MEDLINE INDUSTRIES, INC.

## (undated) DEVICE — DRAPE SHEET LG

## (undated) DEVICE — GUIDE REVISION PLANNING SEQUENCE WITH BLUEPRINTING

## (undated) DEVICE — DRESS WOUND AQUACEL 3.5INX10IN

## (undated) DEVICE — SCREW BSPLT 5MM 34MM AQLS PERFORM RVRS PERIPH NS GLND
Type: IMPLANTABLE DEVICE | Site: SHOULDER | Status: NON-FUNCTIONAL
Removed: 2024-06-25

## (undated) DEVICE — COVER STND 54X23IN MAYO REINF

## (undated) DEVICE — Device

## (undated) DEVICE — POSITIONER OR ULN NRV FOAM CONVOLUTE

## (undated) DEVICE — PILLOW ABD 17INX7IN FOAM MED SHLDR LINER WICK WST STRAP ADJ

## (undated) DEVICE — DRAPE REINFORCE SPLIT ABS TUBE HLDR UNV 120X77IN SURG CNVRT

## (undated) DEVICE — 3M™ MEDITPORE™ SOFT CLOTH TAPE 6 IN X 10 YD 12 ROLLS/CASE 2966: Brand: 3M™ MEDIPORE™

## (undated) DEVICE — SUTURE STRATAFIX MONOCRYL + ANBCTRL UNDIR ABS

## (undated) DEVICE — DRAPE 2 INCS FILM ANTIMICROBIAL 23X17IN SURG IOBAN STRL

## (undated) DEVICE — Device: Brand: BLUEPRINT™ PATIENT SPECIFIC INSTRUMENTATION

## (undated) DEVICE — BLADE SAW 73.8X18.6MM THK.8MM MED NAR SAGITTAL SS STRL LF

## (undated) DEVICE — MASK FACE T-MAX STRL

## (undated) DEVICE — GLOVE SURG 7 PROTEXIS LF BLUE PF SMTH BEAD CUFF INTLK STRL

## (undated) DEVICE — ELECTRODE PT RTN C30- LB 9FT CORD NONIRRITATE NONSENSITIZE

## (undated) DEVICE — GAMMEX® PI HYBRID SIZE 7.5, STERILE POWDER-FREE SURGICAL GLOVE, POLYISOPRENE AND NEOPRENE BLEND: Brand: GAMMEX

## (undated) DEVICE — Device: Brand: STABLECUT®

## (undated) DEVICE — EXOFIN TISSUE ADHESIVE 1.0ML

## (undated) DEVICE — SHOULDER P.A.D II: Brand: DEROYAL

## (undated) DEVICE — BIT DRILL 3.2MM RVRS PERIPH SCR PERFORM STRL

## (undated) DEVICE — SLEEVE CMPR 21- IN MED KN LGTH ADJ CRCMF BLDR NYL KENDALL

## (undated) DEVICE — HOOD SRG FLYTE SURGICOOL PEELAWAY STRL LF DISP

## (undated) DEVICE — SOL  .9 1000ML BTL

## (undated) DEVICE — COVER 48IN 2 TIER PAD HVDTY BACK STRL BLUE EQUIPMENT

## (undated) DEVICE — SPONGE LAPAROTOMY 18X18IN STERILE 5 PK

## (undated) DEVICE — GUIDE PIN PERFORM 3X100

## (undated) DEVICE — DRESSING WND 8X4IN ISLAND PAD SLVRLN 6X2IN

## (undated) DEVICE — DRESSING AQUACEL AG 3.5 X 6

## (undated) DEVICE — SOL  .9 3000ML

## (undated) DEVICE — 2% CHLORHEXIDINE SKIN PREP ORANGE 26ML

## (undated) DEVICE — HANDPIECE SET WITH HIGH FLOW TIP AND SUCTION TUBE: Brand: INTERPULSE

## (undated) DEVICE — SUTURE VICRYL 0 CT1 36IN BRAID COAT ABS UNDYED J946H

## (undated) DEVICE — ENCORE® LATEX ACCLAIM SIZE 8, STERILE LATEX POWDER-FREE SURGICAL GLOVE: Brand: ENCORE

## (undated) DEVICE — WRAP CMPR 5YDX6IN COBAN POR SLFADH ELASTIC LTWT HAND TEAR LF

## (undated) DEVICE — SUTURE MONOCRYL 3-0 Y936H

## (undated) DEVICE — DRAPE ADH STRIP 52X48IN U 19.5X9.5IN SURG STRL LF DISP POLY

## (undated) DEVICE — BANDAGE GAUZE BLK2 4.1YDX4.5IN 6 PLY OPEN WEAVE TIGHT FNSH

## (undated) NOTE — LETTER
3/5/2021              [address]        [place] [address]    To Whom It May Concern,    My patient Osmel Gonzalez 2/26/1973) qualifies for [place] 1b [place] Vacc[place]. Sincerely,    Avis Castillo.  Dinesh Brown MD

## (undated) NOTE — MR AVS SNAPSHOT
[facility]  [address], [address]  [phone]               Thank you for choosing us for your health care visit with Dayana West.  Serena Gamble MD.  We are glad to serve you and happy to provide you with this summary Commonly known as:  ATIVAN           simvastatin 20 MG Tabs   TAKE 1 TABLET BY MOUTH EVERY EVENING   Commonly known as:  ZOCOR           VIAGRA 100 MG Tabs   Generic drug:  Sildenafil Citrate   TAKE 1 TABLET BY MOUTH EVERY DAY APPROXIMATELY ONE HOUR BEFORE

## (undated) NOTE — LETTER
10/4/2018          Dear Dr. Flakito Loyd,    I am contacting you with regard to our mutual patient Mario Dias. He had a psychiatry evaluation resulting in recommendation for Adderall 5mg twice daily to 3 times daily. He started the medication and found it very helpful. He has requested that I refill Adderall in the future. I requested he seek cardiology clearance due to history of myocardial infarction. Please let me know your recommendation, and any limitations or restrictions. If you require additional information please contact our office. Sincerely,    German Walters. Chhaya Andrews MD          Document generated by: Taryn Andrews